# Patient Record
Sex: FEMALE | Race: BLACK OR AFRICAN AMERICAN | NOT HISPANIC OR LATINO | Employment: OTHER | URBAN - METROPOLITAN AREA
[De-identification: names, ages, dates, MRNs, and addresses within clinical notes are randomized per-mention and may not be internally consistent; named-entity substitution may affect disease eponyms.]

---

## 2017-09-18 DIAGNOSIS — Z12.31 SCREENING MAMMOGRAM, ENCOUNTER FOR: Primary | ICD-10-CM

## 2017-09-18 DIAGNOSIS — I10 HTN (HYPERTENSION): Primary | ICD-10-CM

## 2017-09-29 ENCOUNTER — HOSPITAL ENCOUNTER (OUTPATIENT)
Dept: RADIOLOGY | Facility: HOSPITAL | Age: 57
Discharge: HOME | End: 2017-09-29
Attending: INTERNAL MEDICINE
Payer: MEDICAID

## 2017-09-29 VITALS — HEIGHT: 64 IN | WEIGHT: 136 LBS | BODY MASS INDEX: 23.22 KG/M2

## 2017-09-29 DIAGNOSIS — Z12.31 SCREENING MAMMOGRAM, ENCOUNTER FOR: ICD-10-CM

## 2017-09-29 PROCEDURE — 77067 SCR MAMMO BI INCL CAD: CPT | Mod: 26,,, | Performed by: RADIOLOGY

## 2017-09-29 PROCEDURE — 77063 BREAST TOMOSYNTHESIS BI: CPT | Mod: 26,,, | Performed by: RADIOLOGY

## 2017-09-29 PROCEDURE — 77067 SCR MAMMO BI INCL CAD: CPT | Mod: TC

## 2018-03-12 ENCOUNTER — LAB VISIT (OUTPATIENT)
Dept: LAB | Facility: HOSPITAL | Age: 58
End: 2018-03-12
Attending: INTERNAL MEDICINE
Payer: MEDICAID

## 2018-03-12 DIAGNOSIS — I10 ESSENTIAL HYPERTENSION, MALIGNANT: Primary | ICD-10-CM

## 2018-03-12 DIAGNOSIS — I10 ESSENTIAL HYPERTENSION, MALIGNANT: ICD-10-CM

## 2018-03-12 LAB
ALBUMIN SERPL BCP-MCNC: 3.4 G/DL
ALP SERPL-CCNC: 79 U/L
ALT SERPL W/O P-5'-P-CCNC: 7 U/L
ANION GAP SERPL CALC-SCNC: 7 MMOL/L
AST SERPL-CCNC: 14 U/L
BASOPHILS # BLD AUTO: 0.04 K/UL
BASOPHILS NFR BLD: 0.8 %
BILIRUB SERPL-MCNC: 0.2 MG/DL
BUN SERPL-MCNC: 11 MG/DL
CALCIUM SERPL-MCNC: 9.4 MG/DL
CHLORIDE SERPL-SCNC: 106 MMOL/L
CO2 SERPL-SCNC: 29 MMOL/L
CREAT SERPL-MCNC: 1.2 MG/DL
DIFFERENTIAL METHOD: ABNORMAL
EOSINOPHIL # BLD AUTO: 0.2 K/UL
EOSINOPHIL NFR BLD: 4.7 %
ERYTHROCYTE [DISTWIDTH] IN BLOOD BY AUTOMATED COUNT: 14.8 %
EST. GFR  (AFRICAN AMERICAN): 58 ML/MIN/1.73 M^2
EST. GFR  (NON AFRICAN AMERICAN): 50.3 ML/MIN/1.73 M^2
GLUCOSE SERPL-MCNC: 75 MG/DL
HCT VFR BLD AUTO: 38 %
HGB BLD-MCNC: 12.3 G/DL
LYMPHOCYTES # BLD AUTO: 2.6 K/UL
LYMPHOCYTES NFR BLD: 50.4 %
MCH RBC QN AUTO: 27.5 PG
MCHC RBC AUTO-ENTMCNC: 32.4 G/DL
MCV RBC AUTO: 85 FL
MONOCYTES # BLD AUTO: 0.5 K/UL
MONOCYTES NFR BLD: 10.2 %
NEUTROPHILS # BLD AUTO: 1.7 K/UL
NEUTROPHILS NFR BLD: 33.7 %
PLATELET # BLD AUTO: 411 K/UL
PMV BLD AUTO: 8.3 FL
POTASSIUM SERPL-SCNC: 3.7 MMOL/L
PROT SERPL-MCNC: 6.9 G/DL
RBC # BLD AUTO: 4.48 M/UL
SODIUM SERPL-SCNC: 142 MMOL/L
TSH SERPL DL<=0.005 MIU/L-ACNC: 0.57 UIU/ML
WBC # BLD AUTO: 5.1 K/UL

## 2018-03-12 PROCEDURE — 36415 COLL VENOUS BLD VENIPUNCTURE: CPT | Mod: PO

## 2018-03-12 PROCEDURE — 84443 ASSAY THYROID STIM HORMONE: CPT | Mod: PO

## 2018-03-12 PROCEDURE — 85025 COMPLETE CBC W/AUTO DIFF WBC: CPT | Mod: PO

## 2018-03-12 PROCEDURE — 80061 LIPID PANEL: CPT

## 2018-03-12 PROCEDURE — 80053 COMPREHEN METABOLIC PANEL: CPT | Mod: PO

## 2018-03-13 LAB
CHOLEST SERPL-MCNC: 177 MG/DL
CHOLEST/HDLC SERPL: 3.4 {RATIO}
HDLC SERPL-MCNC: 52 MG/DL
HDLC SERPL: 29.4 %
LDLC SERPL CALC-MCNC: 91.8 MG/DL
NONHDLC SERPL-MCNC: 125 MG/DL
TRIGL SERPL-MCNC: 166 MG/DL

## 2018-11-16 ENCOUNTER — TELEPHONE (OUTPATIENT)
Dept: RADIOLOGY | Facility: HOSPITAL | Age: 58
End: 2018-11-16

## 2018-11-19 ENCOUNTER — TELEPHONE (OUTPATIENT)
Dept: RADIOLOGY | Facility: HOSPITAL | Age: 58
End: 2018-11-19

## 2019-01-21 DIAGNOSIS — R10.13 ABDOMINAL PAIN, EPIGASTRIC: Primary | ICD-10-CM

## 2019-05-18 ENCOUNTER — HOSPITAL ENCOUNTER (EMERGENCY)
Facility: HOSPITAL | Age: 59
Discharge: HOME | End: 2019-05-18
Attending: EMERGENCY MEDICINE
Payer: MEDICAID

## 2019-05-18 VITALS
HEIGHT: 64 IN | DIASTOLIC BLOOD PRESSURE: 72 MMHG | RESPIRATION RATE: 18 BRPM | TEMPERATURE: 98 F | BODY MASS INDEX: 22.58 KG/M2 | SYSTOLIC BLOOD PRESSURE: 120 MMHG | WEIGHT: 132.25 LBS | OXYGEN SATURATION: 97 % | HEART RATE: 72 BPM

## 2019-05-18 DIAGNOSIS — M77.11 LATERAL EPICONDYLITIS OF RIGHT ELBOW: Primary | ICD-10-CM

## 2019-05-18 PROCEDURE — 96372 THER/PROPH/DIAG INJ SC/IM: CPT | Mod: ER

## 2019-05-18 PROCEDURE — 99284 EMERGENCY DEPT VISIT MOD MDM: CPT | Mod: 25,ER

## 2019-05-18 PROCEDURE — 63600175 PHARM REV CODE 636 W HCPCS: Mod: ER | Performed by: NURSE PRACTITIONER

## 2019-05-18 RX ORDER — MELOXICAM 7.5 MG/1
7.5 TABLET ORAL DAILY
Qty: 10 TABLET | Refills: 0 | Status: SHIPPED | OUTPATIENT
Start: 2019-05-18

## 2019-05-18 RX ORDER — KETOROLAC TROMETHAMINE 30 MG/ML
60 INJECTION, SOLUTION INTRAMUSCULAR; INTRAVENOUS
Status: COMPLETED | OUTPATIENT
Start: 2019-05-18 | End: 2019-05-18

## 2019-05-18 RX ADMIN — KETOROLAC TROMETHAMINE 60 MG: 30 INJECTION, SOLUTION INTRAMUSCULAR; INTRAVENOUS at 08:05

## 2019-05-19 NOTE — ED PROVIDER NOTES
Encounter Date: 2019       History     Chief Complaint   Patient presents with    Muscle Pain     right elbow pain. Denies trauma.     The history is provided by the patient.   General Illness    The current episode started several days ago (RIGHT ELBOW AND ARM PAIN 4 DAYS AGO ). The problem occurs continuously. The problem has been gradually worsening (DENIES INJURY ). The pain is at a severity of 7/10. Nothing relieves the symptoms. The symptoms are aggravated by activity and movement. Pertinent negatives include no fever, no abdominal pain, no constipation, no diarrhea, no nausea, no vomiting, no sore throat, no muscle aches, no neck pain, no neck stiffness, no cough, no shortness of breath, no URI, no wheezing, no rash and no diaper rash. She has received no recent medical care.     Review of patient's allergies indicates:  No Known Allergies  Past Medical History:   Diagnosis Date    Anemia     Chronic constipation     Cystic disease of breast     Diverticulitis     Hyperlipidemia     Hypertension     PAD (peripheral artery disease)      Past Surgical History:   Procedure Laterality Date    ABDOMINAL SURGERY      BREAST CYST EXCISION Right     CHOLECYSTECTOMY      COLONOSCOPY N/A 3/18/2014    Performed by [facility] at [facility]    EGD (ESOPHAGOGASTRODUODENOSCOPY) N/A 3/18/2014    Performed by [facility] at [facility]    HYSTERECTOMY      LEG AMPUTATION      L BKA    TOTAL ABDOMINAL HYSTERECTOMY W/ BILATERAL SALPINGOOPHORECTOMY       Family History   Problem Relation Age of Onset    Diabetes Mother     Hypertension Sister     Cancer Sister     Colon cancer Sister     Hypertension Father     Breast cancer Maternal Aunt      Social History     Tobacco Use    Smoking status: Former Smoker     Packs/day: 0.25     Years: 10.00     Pack years: 2.50     Last attempt to quit: 3/26/2012     Years since quittin.1    Smokeless tobacco: Never Used    Tobacco comment:  quit 3 months ago   Substance Use Topics    Alcohol use: No     Alcohol/week: 0.0 oz    Drug use: No     Review of Systems   Constitutional: Negative for fever.   HENT: Negative for sore throat.    Respiratory: Negative for cough, shortness of breath and wheezing.    Cardiovascular: Negative for chest pain.   Gastrointestinal: Negative for abdominal pain, constipation, diarrhea, nausea and vomiting.   Genitourinary: Negative for dysuria.   Musculoskeletal: Negative for back pain and neck pain.        RIGHT ELBOW AND FOREARM PAIN    Skin: Negative for rash.   Neurological: Negative for weakness.   Hematological: Does not bruise/bleed easily.   All other systems reviewed and are negative.      Physical Exam     Initial Vitals [05/18/19 2018]   BP Pulse Resp Temp SpO2   115/66 69 18 98.4 °F (36.9 °C) 99 %      MAP       --         Physical Exam    Nursing note and vitals reviewed.  Constitutional: She appears well-developed and well-nourished.   HENT:   Head: Normocephalic and atraumatic.   Mouth/Throat: No oropharyngeal exudate.   Eyes: Conjunctivae, EOM and lids are normal. Pupils are equal, round, and reactive to light. Lids are everted and swept, no foreign bodies found.   Neck: Trachea normal, normal range of motion, full passive range of motion without pain and phonation normal. Neck supple.   Cardiovascular: Normal rate, regular rhythm, S1 normal, S2 normal, normal heart sounds, intact distal pulses and normal pulses.   Pulmonary/Chest: Effort normal and breath sounds normal. No respiratory distress.   Abdominal: Soft. Bowel sounds are normal.   Musculoskeletal: She exhibits tenderness.        Right elbow: She exhibits decreased range of motion. She exhibits no swelling and no deformity. Tenderness found. Lateral epicondyle tenderness noted.        Arms:  Right elbow pain    Lymphadenopathy:     She has no cervical adenopathy.     She has no axillary adenopathy.   Neurological: She is alert and oriented to  person, place, and time. She has normal strength and normal reflexes. No cranial nerve deficit or sensory deficit. She displays a negative Romberg sign.   Skin: Skin is warm and dry. Capillary refill takes less than 2 seconds.         ED Course   Procedures  Labs Reviewed - No data to display       Imaging Results    None                 All historical, clinical, radiographic, and laboratory findings were reviewed with the patient in detail.  Findings are consistent with a diagnosis of lateral epicondylitis.  All remaining questions and concerns were addressed at that time.  Patient has been counseled regarding the need for follow-up as well as the indication to return to the emergency room should new or worrisome developments occur.                Clinical Impression:       ICD-10-CM ICD-9-CM   1. Lateral epicondylitis of right elbow M77.11 726.32                                Juma Torres NP  05/18/19 2048

## 2019-07-08 DIAGNOSIS — N28.1 ACQUIRED CYST OF KIDNEY: ICD-10-CM

## 2019-07-08 DIAGNOSIS — M45.5 ANKYLOSING SPONDYLITIS OF THORACOLUMBAR REGION: Primary | ICD-10-CM

## 2019-07-17 ENCOUNTER — HOSPITAL ENCOUNTER (OUTPATIENT)
Dept: RADIOLOGY | Facility: HOSPITAL | Age: 59
Discharge: HOME | End: 2019-07-17
Attending: NURSE PRACTITIONER
Payer: MEDICAID

## 2019-07-17 DIAGNOSIS — M45.5 ANKYLOSING SPONDYLITIS OF THORACOLUMBAR REGION: ICD-10-CM

## 2019-07-17 DIAGNOSIS — N28.1 ACQUIRED CYST OF KIDNEY: ICD-10-CM

## 2019-07-17 PROCEDURE — 76770 US KIDNEY: ICD-10-PCS | Mod: 26,,, | Performed by: RADIOLOGY

## 2019-07-17 PROCEDURE — 76770 US EXAM ABDO BACK WALL COMP: CPT | Mod: TC,PO

## 2019-07-17 PROCEDURE — 76770 US EXAM ABDO BACK WALL COMP: CPT | Mod: 26,,, | Performed by: RADIOLOGY

## 2019-10-20 ENCOUNTER — HOSPITAL ENCOUNTER (EMERGENCY)
Facility: HOSPITAL | Age: 59
Discharge: HOME | End: 2019-10-20
Attending: EMERGENCY MEDICINE
Payer: MEDICAID

## 2019-10-20 VITALS
HEART RATE: 65 BPM | TEMPERATURE: 99 F | OXYGEN SATURATION: 98 % | WEIGHT: 125.69 LBS | SYSTOLIC BLOOD PRESSURE: 150 MMHG | BODY MASS INDEX: 21.57 KG/M2 | DIASTOLIC BLOOD PRESSURE: 79 MMHG | RESPIRATION RATE: 18 BRPM

## 2019-10-20 DIAGNOSIS — R21 RASH: Primary | ICD-10-CM

## 2019-10-20 DIAGNOSIS — T78.40XA ALLERGIC REACTION, INITIAL ENCOUNTER: ICD-10-CM

## 2019-10-20 DIAGNOSIS — I10 ELEVATED BLOOD PRESSURE READING WITH DIAGNOSIS OF HYPERTENSION: ICD-10-CM

## 2019-10-20 DIAGNOSIS — F17.200 CURRENT SMOKER: ICD-10-CM

## 2019-10-20 PROCEDURE — 63600175 PHARM REV CODE 636 W HCPCS: Mod: ER | Performed by: PHYSICIAN ASSISTANT

## 2019-10-20 PROCEDURE — 96372 THER/PROPH/DIAG INJ SC/IM: CPT | Mod: ER

## 2019-10-20 PROCEDURE — 99284 EMERGENCY DEPT VISIT MOD MDM: CPT | Mod: 25,ER

## 2019-10-20 RX ORDER — DIPHENHYDRAMINE HCL 25 MG
25 CAPSULE ORAL EVERY 6 HOURS PRN
Qty: 20 CAPSULE | Refills: 0 | Status: SHIPPED | OUTPATIENT
Start: 2019-10-20

## 2019-10-20 RX ORDER — DIPHENHYDRAMINE HYDROCHLORIDE 50 MG/ML
25 INJECTION INTRAMUSCULAR; INTRAVENOUS
Status: COMPLETED | OUTPATIENT
Start: 2019-10-20 | End: 2019-10-20

## 2019-10-20 RX ORDER — MUPIROCIN 20 MG/G
OINTMENT TOPICAL 3 TIMES DAILY
Qty: 30 G | Refills: 0 | Status: SHIPPED | OUTPATIENT
Start: 2019-10-20 | End: 2019-10-30

## 2019-10-20 RX ORDER — METHYLPREDNISOLONE 4 MG/1
TABLET ORAL
Qty: 1 PACKAGE | Refills: 0 | OUTPATIENT
Start: 2019-10-20 | End: 2024-03-03

## 2019-10-20 RX ORDER — ALBUTEROL SULFATE 90 UG/1
AEROSOL, METERED RESPIRATORY (INHALATION)
COMMUNITY

## 2019-10-20 RX ORDER — METHYLPREDNISOLONE SOD SUCC 125 MG
125 VIAL (EA) INJECTION ONCE
Status: COMPLETED | OUTPATIENT
Start: 2019-10-20 | End: 2019-10-20

## 2019-10-20 RX ORDER — LOSARTAN POTASSIUM 50 MG/1
50 TABLET ORAL
COMMUNITY

## 2019-10-20 RX ADMIN — DIPHENHYDRAMINE HYDROCHLORIDE 25 MG: 50 INJECTION INTRAMUSCULAR; INTRAVENOUS at 03:10

## 2019-10-20 RX ADMIN — METHYLPREDNISOLONE SODIUM SUCCINATE 125 MG: 125 INJECTION, POWDER, FOR SOLUTION INTRAMUSCULAR; INTRAVENOUS at 03:10

## 2019-10-20 NOTE — ED PROVIDER NOTES
History      Chief Complaint   Patient presents with    Rash     Rash to midline cervical neck, left side of neck, and left elbow. Pt c/o itching. Started yesterday, getting worse. Denies shortness of breath, exposure, or new meds. AAOx4       Review of patient's allergies indicates:  No Known Allergies     HPI   HPI    10/20/2019, 3:04 PM   History obtained from the patient      History of Present Illness: Margarita Menendezor is a 59 y.o. female patient who presents to the Emergency Department for rash of back, left side of neck and left arm x one day.  Associated symptoms include itching.  Patient denies new medication, soap, detergent, clothing, perfume.  Denies fever, vomiting, diarrhea, chest pain, SOB, headache, dizziness.  Treatments tried include Neosporin.        Arrival mode: Personal vehicle     PCP: GASPER Hinds       Past Medical History:  Past Medical History:   Diagnosis Date    Anemia     Chronic constipation     COPD (chronic obstructive pulmonary disease)     Cystic disease of breast     Diverticulitis     Hyperlipidemia     Hypertension     PAD (peripheral artery disease)        Past Surgical History:  Past Surgical History:   Procedure Laterality Date    ABDOMINAL SURGERY      BREAST CYST EXCISION Right     CHOLECYSTECTOMY      HYSTERECTOMY      LEG AMPUTATION      L BKA    TOTAL ABDOMINAL HYSTERECTOMY W/ BILATERAL SALPINGOOPHORECTOMY           Family History:  Family History   Problem Relation Age of Onset    Diabetes Mother     Hypertension Sister     Cancer Sister     Colon cancer Sister     Hypertension Father     Breast cancer Maternal Aunt        Social History:  Social History     Tobacco Use    Smoking status: Current Every Day Smoker     Packs/day: 0.25     Years: 10.00     Pack years: 2.50     Types: Cigarettes     Last attempt to quit: 3/26/2012     Years since quittin.5    Smokeless tobacco: Never Used    Tobacco comment: quit 3 months ago    Substance and Sexual Activity    Alcohol use: No     Alcohol/week: 0.0 standard drinks    Drug use: No    Sexual activity: Not Currently       ROS   Review of Systems   Constitutional: Negative for appetite change and fever.   HENT: Negative for congestion and rhinorrhea.    Eyes: Negative for photophobia and discharge.   Respiratory: Negative for cough and wheezing.    Cardiovascular: Negative for chest pain and palpitations.   Gastrointestinal: Negative for diarrhea, nausea and vomiting.   Genitourinary: Negative for dysuria and frequency.   Musculoskeletal: Negative for back pain and neck pain.   Skin: Positive for rash. Negative for wound.   Neurological: Negative for dizziness and headaches.       Physical Exam      Initial Vitals [10/20/19 1456]   BP Pulse Resp Temp SpO2   (!) 160/80 70 20 98 °F (36.7 °C) 99 %      MAP       --          Physical Exam  Nursing Notes and Vital Signs Reviewed.  Constitutional: Patient is in no apparent distress. Awake and alert. Well-developed and well-nourished.  Head: Atraumatic. Normocephalic.  Eyes: PERRL. EOM intact. Conjunctivae are not pale. No scleral icterus.  ENT: Mucous membranes are moist. Oropharynx is clear and symmetric.    Neck: Supple. Full ROM. No lymphadenopathy.  Cardiovascular: Regular rate. Regular rhythm. No murmurs, rubs, or gallops. Distal pulses are 2+ and symmetric.  Pulmonary/Chest: No respiratory distress. Clear to auscultation bilaterally. No wheezing, rales, or rhonchi.  Abdominal: Soft and non-distended.  There is no tenderness.  No rebound, guarding, or rigidity. Good bowel sounds.  Genitourinary: No CVA tenderness  Musculoskeletal: Moves all extremities. No obvious deformities. No edema. No calf tenderness.  Skin: Warm and dry.  Erythematous papules of back and left arm.  Skin colored papules of left side of neck.    Neurological:  Alert, awake, and appropriate.  Normal speech.  No acute focal neurological deficits are  appreciated.  Psychiatric: Normal affect. Good eye contact. Appropriate in content.    ED Course    Procedures  ED Vital Signs:  Vitals:    10/20/19 1456 10/20/19 1601   BP: (!) 160/80 (!) 150/79   Pulse: 70 65   Resp: 20 18   Temp: 98 °F (36.7 °C) 98.5 °F (36.9 °C)   TempSrc: Oral    SpO2: 99% 98%   Weight: 57 kg (125 lb 10.6 oz)        Abnormal Lab Results:  Labs Reviewed - No data to display     All Lab Results:  None    Imaging Results:  Imaging Results    None                 The Emergency Provider reviewed the vital signs and test results, which are outlined above.    ED Discussion     3:55 PM: Reassessed pt at this time.  Pt states her condition has improved at this time. Discussed with pt all pertinent ED information and results. Discussed pt dx and plan of tx. Gave pt all f/u and return to the ED instructions. All questions and concerns were addressed at this time. Pt expresses understanding of information and instructions, and is comfortable with plan to discharge. Pt is stable for discharge.    I discussed with patient and/or family/caretaker that evaluation in the ED does not suggest any emergent or life threatening medical conditions requiring immediate intervention beyond what was provided in the ED, and I believe patient is safe for discharge.  Regardless, an unremarkable evaluation in the ED does not preclude the development or presence of a serious of life threatening condition. As such, patient was instructed to return immediately for any worsening or change in current symptoms.    Pre-hypertension/Hypertension: The pt has been informed that they may have pre-hypertension or hypertension based on a blood pressure reading in the ED. I recommend that the pt call the PCP listed on their discharge instructions or a physician of their choice this week to arrange f/u for further evaluation of possible pre-hypertension or hypertension.       ED Medication(s):  Medications   diphenhydrAMINE injection 25 mg  (25 mg Intramuscular Given 10/20/19 1538)   methylPREDNISolone sodium succinate injection 125 mg (125 mg Intramuscular Given 10/20/19 1538)       Discharge Medication List as of 10/20/2019  3:29 PM      START taking these medications    Details   diphenhydrAMINE (BENADRYL) 25 mg capsule Take 1 capsule (25 mg total) by mouth every 6 (six) hours as needed for Itching or Allergies., Starting Sun 10/20/2019, Print      methylPREDNISolone (MEDROL DOSEPACK) 4 mg tablet use as directed, Print      mupirocin (BACTROBAN) 2 % ointment Apply topically 3 (three) times daily. for 10 days, Starting Sun 10/20/2019, Until Wed 10/30/2019, Print             Follow-up Information     GASPER Hinds. Schedule an appointment as soon as possible for a visit in 3 days.    Specialty:  Family Medicine  Contact information:  [address] [place] [id number]  [phone]                     Medical Decision Making        Additional MDM:   Hypertension: The patient has hypertension (no treatment required at this time). The patient's condition was felt to be stable.   Smoking Cessation: The patient is a smoker. The patient was counseled on smoking cessation for: 3 minutes. The patient was counseled on tobacco related  health complications.            Clinical Impression       ICD-10-CM ICD-9-CM   1. Rash R21 782.1   2. Allergic reaction, initial encounter T78.40XA 995.3   3. Elevated blood pressure reading with diagnosis of hypertension I10 401.9   4. Current smoker F17.200 305.1       Disposition:   Disposition: Discharged  Condition: Stable           Leslie Layton PA-C  10/20/19 8357

## 2019-12-16 DIAGNOSIS — M25.522 LEFT ELBOW PAIN: Primary | ICD-10-CM

## 2020-02-19 ENCOUNTER — HOSPITAL ENCOUNTER (OUTPATIENT)
Dept: RADIOLOGY | Facility: HOSPITAL | Age: 60
Discharge: HOME | End: 2020-02-19
Attending: NURSE PRACTITIONER
Payer: MEDICAID

## 2020-02-19 VITALS — BODY MASS INDEX: 21.46 KG/M2 | HEIGHT: 64 IN | WEIGHT: 125.69 LBS

## 2020-02-19 DIAGNOSIS — Z12.31 ENCOUNTER FOR SCREENING MAMMOGRAM FOR MALIGNANT NEOPLASM OF BREAST: ICD-10-CM

## 2020-02-19 PROCEDURE — 77067 SCR MAMMO BI INCL CAD: CPT | Mod: 26,,, | Performed by: RADIOLOGY

## 2020-02-19 PROCEDURE — 77063 BREAST TOMOSYNTHESIS BI: CPT | Mod: 26,,, | Performed by: RADIOLOGY

## 2020-02-19 PROCEDURE — 77067 SCR MAMMO BI INCL CAD: CPT | Mod: TC,PO

## 2020-02-19 PROCEDURE — 77063 MAMMO DIGITAL SCREENING BILAT WITH TOMOSYNTHESIS_CAD: ICD-10-PCS | Mod: 26,,, | Performed by: RADIOLOGY

## 2020-02-19 PROCEDURE — 77067 MAMMO DIGITAL SCREENING BILAT WITH TOMOSYNTHESIS_CAD: ICD-10-PCS | Mod: 26,,, | Performed by: RADIOLOGY

## 2021-02-05 DIAGNOSIS — M54.9 DORSALGIA, UNSPECIFIED: ICD-10-CM

## 2021-02-05 DIAGNOSIS — R52 SCAR PAINFUL: Primary | ICD-10-CM

## 2021-02-05 DIAGNOSIS — L90.5 SCAR PAINFUL: Primary | ICD-10-CM

## 2021-03-17 ENCOUNTER — HOSPITAL ENCOUNTER (OUTPATIENT)
Dept: RADIOLOGY | Facility: HOSPITAL | Age: 61
Discharge: HOME | End: 2021-03-17
Attending: NURSE PRACTITIONER
Payer: MEDICAID

## 2021-03-17 VITALS — HEIGHT: 64 IN | WEIGHT: 125.69 LBS | BODY MASS INDEX: 21.46 KG/M2

## 2021-03-17 DIAGNOSIS — Z12.31 BREAST CANCER SCREENING BY MAMMOGRAM: ICD-10-CM

## 2021-03-17 PROCEDURE — 77063 MAMMO DIGITAL SCREENING BILAT WITH TOMO: ICD-10-PCS | Mod: 26,,, | Performed by: RADIOLOGY

## 2021-03-17 PROCEDURE — 77067 SCR MAMMO BI INCL CAD: CPT | Mod: TC,PO

## 2021-03-17 PROCEDURE — 77067 MAMMO DIGITAL SCREENING BILAT WITH TOMO: ICD-10-PCS | Mod: 26,,, | Performed by: RADIOLOGY

## 2021-03-17 PROCEDURE — 77067 SCR MAMMO BI INCL CAD: CPT | Mod: 26,,, | Performed by: RADIOLOGY

## 2021-03-17 PROCEDURE — 77063 BREAST TOMOSYNTHESIS BI: CPT | Mod: 26,,, | Performed by: RADIOLOGY

## 2021-05-04 ENCOUNTER — HOSPITAL ENCOUNTER (OUTPATIENT)
Dept: RADIOLOGY | Facility: HOSPITAL | Age: 61
Discharge: HOME | End: 2021-05-04
Attending: NURSE PRACTITIONER
Payer: MEDICAID

## 2021-05-04 DIAGNOSIS — R52 SCAR PAINFUL: ICD-10-CM

## 2021-05-04 DIAGNOSIS — M54.9 DORSALGIA, UNSPECIFIED: ICD-10-CM

## 2021-05-04 DIAGNOSIS — L90.5 SCAR PAINFUL: ICD-10-CM

## 2021-05-04 PROCEDURE — 72100 X-RAY EXAM L-S SPINE 2/3 VWS: CPT | Mod: 26,,, | Performed by: RADIOLOGY

## 2021-05-04 PROCEDURE — 72100 XR LUMBAR SPINE AP AND LATERAL: ICD-10-PCS | Mod: 26,,, | Performed by: RADIOLOGY

## 2021-05-04 PROCEDURE — 72070 X-RAY EXAM THORAC SPINE 2VWS: CPT | Mod: TC,PO

## 2021-05-04 PROCEDURE — 72040 X-RAY EXAM NECK SPINE 2-3 VW: CPT | Mod: 26,,, | Performed by: RADIOLOGY

## 2021-05-04 PROCEDURE — 72100 X-RAY EXAM L-S SPINE 2/3 VWS: CPT | Mod: TC,PO

## 2021-05-04 PROCEDURE — 72070 XR THORACIC SPINE AP LATERAL: ICD-10-PCS | Mod: 26,,, | Performed by: RADIOLOGY

## 2021-05-04 PROCEDURE — 72070 X-RAY EXAM THORAC SPINE 2VWS: CPT | Mod: 26,,, | Performed by: RADIOLOGY

## 2021-05-04 PROCEDURE — 72040 XR CERVICAL SPINE AP LATERAL: ICD-10-PCS | Mod: 26,,, | Performed by: RADIOLOGY

## 2021-05-04 PROCEDURE — 72040 X-RAY EXAM NECK SPINE 2-3 VW: CPT | Mod: TC,PO

## 2021-12-01 ENCOUNTER — HOSPITAL ENCOUNTER (EMERGENCY)
Facility: HOSPITAL | Age: 61
Discharge: HOME | End: 2021-12-01
Attending: EMERGENCY MEDICINE
Payer: MEDICAID

## 2021-12-01 VITALS
OXYGEN SATURATION: 98 % | SYSTOLIC BLOOD PRESSURE: 110 MMHG | DIASTOLIC BLOOD PRESSURE: 59 MMHG | RESPIRATION RATE: 18 BRPM | HEART RATE: 63 BPM

## 2021-12-01 DIAGNOSIS — R55 SYNCOPE, UNSPECIFIED SYNCOPE TYPE: Primary | ICD-10-CM

## 2021-12-01 LAB
ALBUMIN SERPL BCP-MCNC: 3.5 G/DL (ref 3.5–5.2)
ALP SERPL-CCNC: 78 U/L (ref 55–135)
ALT SERPL W/O P-5'-P-CCNC: 9 U/L (ref 10–44)
ANION GAP SERPL CALC-SCNC: 14 MMOL/L (ref 8–16)
AST SERPL-CCNC: 19 U/L (ref 10–40)
BASOPHILS # BLD AUTO: 0.03 K/UL (ref 0–0.2)
BASOPHILS NFR BLD: 0.5 % (ref 0–1.9)
BILIRUB SERPL-MCNC: 0.3 MG/DL (ref 0.1–1)
BNP SERPL-MCNC: 268 PG/ML (ref 0–99)
BUN SERPL-MCNC: 12 MG/DL (ref 8–23)
CALCIUM SERPL-MCNC: 9.5 MG/DL (ref 8.7–10.5)
CHLORIDE SERPL-SCNC: 105 MMOL/L (ref 95–110)
CO2 SERPL-SCNC: 24 MMOL/L (ref 23–29)
CREAT SERPL-MCNC: 1.2 MG/DL (ref 0.5–1.4)
DIFFERENTIAL METHOD: ABNORMAL
EOSINOPHIL # BLD AUTO: 0.2 K/UL (ref 0–0.5)
EOSINOPHIL NFR BLD: 3.2 % (ref 0–8)
ERYTHROCYTE [DISTWIDTH] IN BLOOD BY AUTOMATED COUNT: 15.2 % (ref 11.5–14.5)
EST. GFR  (AFRICAN AMERICAN): 56 ML/MIN/1.73 M^2
EST. GFR  (NON AFRICAN AMERICAN): 49 ML/MIN/1.73 M^2
GLUCOSE SERPL-MCNC: 99 MG/DL (ref 70–110)
HCT VFR BLD AUTO: 39.4 % (ref 37–48.5)
HGB BLD-MCNC: 12.8 G/DL (ref 12–16)
IMM GRANULOCYTES # BLD AUTO: 0.02 K/UL (ref 0–0.04)
IMM GRANULOCYTES NFR BLD AUTO: 0.4 % (ref 0–0.5)
LYMPHOCYTES # BLD AUTO: 2.6 K/UL (ref 1–4.8)
LYMPHOCYTES NFR BLD: 46.2 % (ref 18–48)
MAGNESIUM SERPL-MCNC: 1.8 MG/DL (ref 1.6–2.6)
MCH RBC QN AUTO: 27.9 PG (ref 27–31)
MCHC RBC AUTO-ENTMCNC: 32.5 G/DL (ref 32–36)
MCV RBC AUTO: 86 FL (ref 82–98)
MONOCYTES # BLD AUTO: 0.5 K/UL (ref 0.3–1)
MONOCYTES NFR BLD: 9.5 % (ref 4–15)
NEUTROPHILS # BLD AUTO: 2.3 K/UL (ref 1.8–7.7)
NEUTROPHILS NFR BLD: 40.2 % (ref 38–73)
NRBC BLD-RTO: 0 /100 WBC
PLATELET # BLD AUTO: 349 K/UL (ref 150–450)
PMV BLD AUTO: 8.8 FL (ref 9.2–12.9)
POTASSIUM SERPL-SCNC: 2.8 MMOL/L (ref 3.5–5.1)
PROT SERPL-MCNC: 7 G/DL (ref 6–8.4)
RBC # BLD AUTO: 4.59 M/UL (ref 4–5.4)
SODIUM SERPL-SCNC: 143 MMOL/L (ref 136–145)
TROPONIN I SERPL DL<=0.01 NG/ML-MCNC: 0.03 NG/ML (ref 0–0.03)
WBC # BLD AUTO: 5.67 K/UL (ref 3.9–12.7)

## 2021-12-01 PROCEDURE — 99285 EMERGENCY DEPT VISIT HI MDM: CPT | Mod: 25

## 2021-12-01 PROCEDURE — 93005 ELECTROCARDIOGRAM TRACING: CPT

## 2021-12-01 PROCEDURE — 36415 COLL VENOUS BLD VENIPUNCTURE: CPT | Performed by: EMERGENCY MEDICINE

## 2021-12-01 PROCEDURE — 85025 COMPLETE CBC W/AUTO DIFF WBC: CPT | Performed by: EMERGENCY MEDICINE

## 2021-12-01 PROCEDURE — 83735 ASSAY OF MAGNESIUM: CPT | Performed by: EMERGENCY MEDICINE

## 2021-12-01 PROCEDURE — 93010 EKG 12-LEAD: ICD-10-PCS | Mod: 59,,, | Performed by: INTERNAL MEDICINE

## 2021-12-01 PROCEDURE — 36000 PLACE NEEDLE IN VEIN: CPT

## 2021-12-01 PROCEDURE — 83880 ASSAY OF NATRIURETIC PEPTIDE: CPT | Performed by: EMERGENCY MEDICINE

## 2021-12-01 PROCEDURE — 80053 COMPREHEN METABOLIC PANEL: CPT | Performed by: EMERGENCY MEDICINE

## 2021-12-01 PROCEDURE — 25000003 PHARM REV CODE 250: Performed by: EMERGENCY MEDICINE

## 2021-12-01 PROCEDURE — 84484 ASSAY OF TROPONIN QUANT: CPT | Performed by: EMERGENCY MEDICINE

## 2021-12-01 PROCEDURE — 93010 ELECTROCARDIOGRAM REPORT: CPT | Mod: ,,, | Performed by: INTERNAL MEDICINE

## 2021-12-01 RX ORDER — POTASSIUM CHLORIDE 20 MEQ/1
40 TABLET, EXTENDED RELEASE ORAL
Status: COMPLETED | OUTPATIENT
Start: 2021-12-01 | End: 2021-12-01

## 2021-12-01 RX ADMIN — POTASSIUM CHLORIDE 40 MEQ: 20 TABLET, EXTENDED RELEASE ORAL at 03:12

## 2022-01-26 DIAGNOSIS — E87.6 HYPOPOTASSEMIA: ICD-10-CM

## 2022-01-26 DIAGNOSIS — Z13.29 SCREENING FOR THYROID DISORDER: ICD-10-CM

## 2022-01-26 DIAGNOSIS — Z13.1 SCREENING FOR DIABETES MELLITUS: ICD-10-CM

## 2022-01-26 DIAGNOSIS — E55.9 AVITAMINOSIS D: Primary | ICD-10-CM

## 2022-01-26 DIAGNOSIS — I10 ESSENTIAL HYPERTENSION, MALIGNANT: ICD-10-CM

## 2022-01-26 DIAGNOSIS — E78.2 MIXED HYPERLIPIDEMIA: ICD-10-CM

## 2022-01-27 ENCOUNTER — HOSPITAL ENCOUNTER (OUTPATIENT)
Dept: RADIOLOGY | Facility: HOSPITAL | Age: 62
Discharge: HOME | End: 2022-01-27
Attending: NURSE PRACTITIONER
Payer: MEDICAID

## 2022-01-27 DIAGNOSIS — R10.13 ABDOMINAL PAIN, EPIGASTRIC: ICD-10-CM

## 2022-01-27 DIAGNOSIS — M79.671 RIGHT FOOT PAIN: Primary | ICD-10-CM

## 2022-01-27 DIAGNOSIS — R10.13 ABDOMINAL PAIN, EPIGASTRIC: Primary | ICD-10-CM

## 2022-01-27 DIAGNOSIS — M79.671 RIGHT FOOT PAIN: ICD-10-CM

## 2022-01-27 PROCEDURE — 76700 US EXAM ABDOM COMPLETE: CPT | Mod: TC,PO

## 2022-01-27 PROCEDURE — 73630 XR FOOT COMPLETE 3 VIEW RIGHT: ICD-10-PCS | Mod: 26,RT,, | Performed by: RADIOLOGY

## 2022-01-27 PROCEDURE — 73630 X-RAY EXAM OF FOOT: CPT | Mod: 26,RT,, | Performed by: RADIOLOGY

## 2022-01-27 PROCEDURE — 76700 US EXAM ABDOM COMPLETE: CPT | Mod: 26,,, | Performed by: RADIOLOGY

## 2022-01-27 PROCEDURE — 74018 RADEX ABDOMEN 1 VIEW: CPT | Mod: 26,,, | Performed by: RADIOLOGY

## 2022-01-27 PROCEDURE — 74018 RADEX ABDOMEN 1 VIEW: CPT | Mod: TC,PO

## 2022-01-27 PROCEDURE — 73630 X-RAY EXAM OF FOOT: CPT | Mod: TC,PO,RT

## 2022-01-27 PROCEDURE — 74018 XR ABDOMEN AP 1 VIEW: ICD-10-PCS | Mod: 26,,, | Performed by: RADIOLOGY

## 2022-01-27 PROCEDURE — 76700 US ABDOMEN COMPLETE: ICD-10-PCS | Mod: 26,,, | Performed by: RADIOLOGY

## 2022-05-12 ENCOUNTER — HOSPITAL ENCOUNTER (EMERGENCY)
Facility: HOSPITAL | Age: 62
Discharge: HOME | End: 2022-05-12
Attending: EMERGENCY MEDICINE
Payer: MEDICAID

## 2022-05-12 VITALS
TEMPERATURE: 97 F | RESPIRATION RATE: 18 BRPM | WEIGHT: 127.75 LBS | BODY MASS INDEX: 21.93 KG/M2 | OXYGEN SATURATION: 100 % | DIASTOLIC BLOOD PRESSURE: 60 MMHG | HEART RATE: 63 BPM | SYSTOLIC BLOOD PRESSURE: 126 MMHG

## 2022-05-12 DIAGNOSIS — E87.6 HYPOKALEMIA: ICD-10-CM

## 2022-05-12 DIAGNOSIS — K57.32 DIVERTICULITIS OF SIGMOID COLON: Primary | ICD-10-CM

## 2022-05-12 LAB
ALBUMIN SERPL BCP-MCNC: 3.5 G/DL (ref 3.5–5.2)
ALP SERPL-CCNC: 96 U/L (ref 55–135)
ALT SERPL W/O P-5'-P-CCNC: 9 U/L (ref 10–44)
ANION GAP SERPL CALC-SCNC: 15 MMOL/L (ref 8–16)
AST SERPL-CCNC: 13 U/L (ref 10–40)
BACTERIA #/AREA URNS AUTO: ABNORMAL /HPF
BASOPHILS # BLD AUTO: 0.05 K/UL (ref 0–0.2)
BASOPHILS NFR BLD: 0.5 % (ref 0–1.9)
BILIRUB SERPL-MCNC: 0.7 MG/DL (ref 0.1–1)
BILIRUB UR QL STRIP: NEGATIVE
BUN SERPL-MCNC: 10 MG/DL (ref 8–23)
CALCIUM SERPL-MCNC: 10 MG/DL (ref 8.7–10.5)
CHLORIDE SERPL-SCNC: 99 MMOL/L (ref 95–110)
CLARITY UR REFRACT.AUTO: ABNORMAL
CO2 SERPL-SCNC: 26 MMOL/L (ref 23–29)
COLOR UR AUTO: YELLOW
CREAT SERPL-MCNC: 1.2 MG/DL (ref 0.5–1.4)
DIFFERENTIAL METHOD: ABNORMAL
EOSINOPHIL # BLD AUTO: 0.1 K/UL (ref 0–0.5)
EOSINOPHIL NFR BLD: 0.5 % (ref 0–8)
ERYTHROCYTE [DISTWIDTH] IN BLOOD BY AUTOMATED COUNT: 13.7 % (ref 11.5–14.5)
EST. GFR  (AFRICAN AMERICAN): 56.4 ML/MIN/1.73 M^2
EST. GFR  (NON AFRICAN AMERICAN): 48.9 ML/MIN/1.73 M^2
GLUCOSE SERPL-MCNC: 105 MG/DL (ref 70–110)
GLUCOSE UR QL STRIP: NEGATIVE
HCT VFR BLD AUTO: 37 % (ref 37–48.5)
HGB BLD-MCNC: 12.5 G/DL (ref 12–16)
HGB UR QL STRIP: ABNORMAL
HYALINE CASTS UR QL AUTO: 10 /LPF
IMM GRANULOCYTES # BLD AUTO: 0.03 K/UL (ref 0–0.04)
IMM GRANULOCYTES NFR BLD AUTO: 0.3 % (ref 0–0.5)
KETONES UR QL STRIP: NEGATIVE
LEUKOCYTE ESTERASE UR QL STRIP: ABNORMAL
LIPASE SERPL-CCNC: 20 U/L (ref 4–60)
LYMPHOCYTES # BLD AUTO: 1.8 K/UL (ref 1–4.8)
LYMPHOCYTES NFR BLD: 17.1 % (ref 18–48)
MAGNESIUM SERPL-MCNC: 1.7 MG/DL (ref 1.6–2.6)
MCH RBC QN AUTO: 28.3 PG (ref 27–31)
MCHC RBC AUTO-ENTMCNC: 33.8 G/DL (ref 32–36)
MCV RBC AUTO: 84 FL (ref 82–98)
MICROSCOPIC COMMENT: ABNORMAL
MONOCYTES # BLD AUTO: 0.8 K/UL (ref 0.3–1)
MONOCYTES NFR BLD: 7.6 % (ref 4–15)
NEUTROPHILS # BLD AUTO: 7.6 K/UL (ref 1.8–7.7)
NEUTROPHILS NFR BLD: 74 % (ref 38–73)
NITRITE UR QL STRIP: NEGATIVE
NRBC BLD-RTO: 0 /100 WBC
PH UR STRIP: 6 [PH] (ref 5–8)
PLATELET # BLD AUTO: 392 K/UL (ref 150–450)
PMV BLD AUTO: 8.8 FL (ref 9.2–12.9)
POTASSIUM SERPL-SCNC: 2.5 MMOL/L (ref 3.5–5.1)
POTASSIUM SERPL-SCNC: 2.8 MMOL/L (ref 3.5–5.1)
PROT SERPL-MCNC: 7.6 G/DL (ref 6–8.4)
PROT UR QL STRIP: ABNORMAL
RBC # BLD AUTO: 4.41 M/UL (ref 4–5.4)
RBC #/AREA URNS AUTO: 5 /HPF (ref 0–4)
SODIUM SERPL-SCNC: 140 MMOL/L (ref 136–145)
SP GR UR STRIP: 1.03 (ref 1–1.03)
SQUAMOUS #/AREA URNS AUTO: 1 /HPF
TRICHOMONAS UR QL COMP ASSIST: ABNORMAL
URN SPEC COLLECT METH UR: ABNORMAL
UROBILINOGEN UR STRIP-ACNC: NEGATIVE EU/DL
WBC # BLD AUTO: 10.33 K/UL (ref 3.9–12.7)
WBC #/AREA URNS AUTO: 6 /HPF (ref 0–5)
WBC CLUMPS UR QL AUTO: ABNORMAL

## 2022-05-12 PROCEDURE — 96365 THER/PROPH/DIAG IV INF INIT: CPT | Mod: ER

## 2022-05-12 PROCEDURE — 93005 ELECTROCARDIOGRAM TRACING: CPT | Mod: ER

## 2022-05-12 PROCEDURE — 84132 ASSAY OF SERUM POTASSIUM: CPT | Mod: ER,91 | Performed by: EMERGENCY MEDICINE

## 2022-05-12 PROCEDURE — 81000 URINALYSIS NONAUTO W/SCOPE: CPT | Mod: ER | Performed by: EMERGENCY MEDICINE

## 2022-05-12 PROCEDURE — 63600175 PHARM REV CODE 636 W HCPCS: Mod: ER | Performed by: EMERGENCY MEDICINE

## 2022-05-12 PROCEDURE — 25000003 PHARM REV CODE 250: Mod: ER | Performed by: EMERGENCY MEDICINE

## 2022-05-12 PROCEDURE — 83735 ASSAY OF MAGNESIUM: CPT | Mod: ER | Performed by: EMERGENCY MEDICINE

## 2022-05-12 PROCEDURE — 99285 EMERGENCY DEPT VISIT HI MDM: CPT | Mod: 25,ER

## 2022-05-12 PROCEDURE — 85025 COMPLETE CBC W/AUTO DIFF WBC: CPT | Mod: ER | Performed by: EMERGENCY MEDICINE

## 2022-05-12 PROCEDURE — 93010 EKG 12-LEAD: ICD-10-PCS | Mod: ,,, | Performed by: INTERNAL MEDICINE

## 2022-05-12 PROCEDURE — 80053 COMPREHEN METABOLIC PANEL: CPT | Mod: ER | Performed by: EMERGENCY MEDICINE

## 2022-05-12 PROCEDURE — 93010 ELECTROCARDIOGRAM REPORT: CPT | Mod: ,,, | Performed by: INTERNAL MEDICINE

## 2022-05-12 PROCEDURE — 83690 ASSAY OF LIPASE: CPT | Mod: ER | Performed by: EMERGENCY MEDICINE

## 2022-05-12 PROCEDURE — 96375 TX/PRO/DX INJ NEW DRUG ADDON: CPT | Mod: ER

## 2022-05-12 RX ORDER — MORPHINE SULFATE 4 MG/ML
2 INJECTION, SOLUTION INTRAMUSCULAR; INTRAVENOUS
Status: COMPLETED | OUTPATIENT
Start: 2022-05-12 | End: 2022-05-12

## 2022-05-12 RX ORDER — POTASSIUM CHLORIDE 20 MEQ/1
40 TABLET, EXTENDED RELEASE ORAL
Status: COMPLETED | OUTPATIENT
Start: 2022-05-12 | End: 2022-05-12

## 2022-05-12 RX ORDER — CIPROFLOXACIN 500 MG/1
500 TABLET ORAL 2 TIMES DAILY
Qty: 14 TABLET | Refills: 0 | Status: SHIPPED | OUTPATIENT
Start: 2022-05-12 | End: 2022-05-12 | Stop reason: SDUPTHER

## 2022-05-12 RX ORDER — POTASSIUM CHLORIDE 750 MG/1
10 TABLET, EXTENDED RELEASE ORAL 2 TIMES DAILY
Qty: 14 TABLET | Refills: 0 | Status: SHIPPED | OUTPATIENT
Start: 2022-05-12

## 2022-05-12 RX ORDER — CIPROFLOXACIN 500 MG/1
500 TABLET ORAL 2 TIMES DAILY
Qty: 14 TABLET | Refills: 0 | Status: SHIPPED | OUTPATIENT
Start: 2022-05-12 | End: 2022-05-19

## 2022-05-12 RX ORDER — METRONIDAZOLE 500 MG/1
500 TABLET ORAL 3 TIMES DAILY
Qty: 21 TABLET | Refills: 0 | Status: SHIPPED | OUTPATIENT
Start: 2022-05-12 | End: 2022-05-19

## 2022-05-12 RX ORDER — POTASSIUM CHLORIDE 7.45 MG/ML
10 INJECTION INTRAVENOUS
Status: COMPLETED | OUTPATIENT
Start: 2022-05-12 | End: 2022-05-12

## 2022-05-12 RX ORDER — METRONIDAZOLE 500 MG/1
500 TABLET ORAL 3 TIMES DAILY
Qty: 21 TABLET | Refills: 0 | Status: SHIPPED | OUTPATIENT
Start: 2022-05-12 | End: 2022-05-12 | Stop reason: SDUPTHER

## 2022-05-12 RX ORDER — TRAMADOL HYDROCHLORIDE AND ACETAMINOPHEN 37.5; 325 MG/1; MG/1
1 TABLET, FILM COATED ORAL EVERY 6 HOURS PRN
Qty: 12 TABLET | Refills: 0 | Status: SHIPPED | OUTPATIENT
Start: 2022-05-12 | End: 2022-05-22

## 2022-05-12 RX ORDER — POTASSIUM CHLORIDE 750 MG/1
10 TABLET, EXTENDED RELEASE ORAL 2 TIMES DAILY
Qty: 14 TABLET | Refills: 0 | Status: SHIPPED | OUTPATIENT
Start: 2022-05-12 | End: 2022-05-12 | Stop reason: SDUPTHER

## 2022-05-12 RX ADMIN — POTASSIUM CHLORIDE 40 MEQ: 1500 TABLET, EXTENDED RELEASE ORAL at 07:05

## 2022-05-12 RX ADMIN — POTASSIUM CHLORIDE 10 MEQ: 7.46 INJECTION, SOLUTION INTRAVENOUS at 08:05

## 2022-05-12 RX ADMIN — MORPHINE SULFATE 2 MG: 4 INJECTION INTRAVENOUS at 07:05

## 2022-05-12 NOTE — ED PROVIDER NOTES
Encounter Date: 5/12/2022       History     Chief Complaint   Patient presents with    Abdominal Pain     Pt has diverticulitis. Was seen by gi dr this morning who told her to go to er.      Chief complaint: LLQ abdominal pain.    History of present illness: 61 y.o female with c/o onset of LLQ pain this past Sunday (4 days ago). Pain well-localized to LLQ and patient relates she has a history of Diverticulosis. Saw MD today and given meds (? Names) for the discomfort but no improvement and presented here for relief. No c/o fever or chills. No aggravating or mitigating factors. Does have  GI MD who cares for her condition. Pain severity is 10/10. No c/o vomiting or diarrhea. No dysuris,hematuria or flank pain.        Review of patient's allergies indicates:  No Known Allergies  Past Medical History:   Diagnosis Date    Anemia     Chronic constipation     COPD (chronic obstructive pulmonary disease)     Cystic disease of breast     Diverticulitis     Hyperlipidemia     Hypertension     PAD (peripheral artery disease)      Past Surgical History:   Procedure Laterality Date    ABDOMINAL SURGERY      BREAST CYST EXCISION Right     CHOLECYSTECTOMY      HYSTERECTOMY      LEG AMPUTATION      L BKA    TOTAL ABDOMINAL HYSTERECTOMY W/ BILATERAL SALPINGOOPHORECTOMY       Family History   Problem Relation Age of Onset    Diabetes Mother     Hypertension Sister     Cancer Sister     Colon cancer Sister     Hypertension Father     Breast cancer Maternal Aunt      Social History     Tobacco Use    Smoking status: Current Every Day Smoker     Packs/day: 0.25     Years: 10.00     Pack years: 2.50     Types: Cigarettes     Last attempt to quit: 3/26/2012     Years since quitting: 10.1    Smokeless tobacco: Never Used    Tobacco comment: quit 3 months ago   Substance Use Topics    Alcohol use: No     Alcohol/week: 0.0 standard drinks    Drug use: No     Review of Systems   Constitutional: Negative for activity  change, appetite change, chills, fatigue and fever.   HENT: Negative.    Eyes: Negative.    Respiratory: Negative for chest tightness and shortness of breath.    Cardiovascular: Negative for chest pain.   Gastrointestinal: Positive for abdominal pain (LLQ). Negative for blood in stool, constipation, diarrhea, nausea and vomiting.   Genitourinary: Negative.    Musculoskeletal: Negative.    Neurological: Negative for dizziness and light-headedness.   Psychiatric/Behavioral: Negative.    All other systems reviewed and are negative.      Physical Exam     Initial Vitals [05/12/22 1844]   BP Pulse Resp Temp SpO2   115/63 68 18 97.4 °F (36.3 °C) 100 %      MAP       --         Physical Exam    Nursing note and vitals reviewed.  Constitutional: She appears well-nourished. She appears distressed.   HENT:   Head: Normocephalic and atraumatic.   Right Ear: External ear normal.   Left Ear: External ear normal.   Mouth/Throat: Uvula is midline. Mucous membranes are dry.   Cardiovascular: Normal rate, regular rhythm and intact distal pulses.   Pulmonary/Chest: Breath sounds normal. No respiratory distress.   Abdominal: Abdomen is soft. Bowel sounds are normal. She exhibits no distension. There is abdominal tenderness (LLQ). There is guarding (Voluntary). There is no rebound.   Musculoskeletal:         General: Normal range of motion.      Comments: Left BKA      Neurological: She is alert and oriented to person, place, and time. She has normal strength. GCS score is 15. GCS eye subscore is 4. GCS verbal subscore is 5. GCS motor subscore is 6.   Skin: Skin is warm and dry.   Psychiatric: She has a normal mood and affect. Her behavior is normal.         ED Course   Procedures  Labs Reviewed   CBC W/ AUTO DIFFERENTIAL - Abnormal; Notable for the following components:       Result Value    MPV 8.8 (*)     Gran % 74.0 (*)     Lymph % 17.1 (*)     All other components within normal limits   COMPREHENSIVE METABOLIC PANEL - Abnormal;  Notable for the following components:    Potassium 2.5 (*)     ALT 9 (*)     eGFR if  56.4 (*)     eGFR if non  48.9 (*)     All other components within normal limits    Narrative:     Potassium critical result(s) called and verbal readback obtained from   Dago Royal RN by TINO 05/12/2022 19:44   URINALYSIS, REFLEX TO URINE CULTURE - Abnormal; Notable for the following components:    Appearance, UA Hazy (*)     Protein, UA 1+ (*)     Occult Blood UA Trace (*)     Leukocytes, UA Trace (*)     All other components within normal limits    Narrative:     Specimen Source->Urine   URINALYSIS MICROSCOPIC - Abnormal; Notable for the following components:    RBC, UA 5 (*)     WBC, UA 6 (*)     Bacteria Few (*)     Hyaline Casts, UA 10 (*)     Trichomonas, UA Rare (*)     All other components within normal limits    Narrative:     Specimen Source->Urine   POTASSIUM - Abnormal; Notable for the following components:    Potassium 2.8 (*)     All other components within normal limits   LIPASE   MAGNESIUM   MAGNESIUM       EKG Readings: (Independently Interpreted)   Initial Reading: No STEMI. Rhythm: Normal Sinus Rhythm. Heart Rate: 62. Ectopy: No Ectopy. Axis: Normal. Other Impression: Flattening of T waves/No U waves noted / Abnormal EKG       Imaging Results    None          Medications - No data to display  Medical Decision Making:   Clinical Tests:   Lab Tests: Reviewed and Ordered  Radiological Study: Ordered and Reviewed  Medical Tests: Ordered and Reviewed  ED Management:  8:20 PM: Pt with excellent relief from meds. Potassium noted to be low at 2.5 and IV and PO treatment given. CT reveals mild Diverticulitis in Sigmoid consistent with presenting complaint. No arrythmia or other clinical concerns at present for the low K+ but will recheck prior to discharge.  Repeat potassium is 2.8. While still remains< 3.0, patient is very reliable for filling RX for Potassium and followup with PCP for  repeat level and advised strongly to do so.   Pain remains markedly improved and patient is very comfortable with discharge plans.  No clinical indicators noted for admission                      Clinical Impression:        ICD-10-CM ICD-9-CM   1. Diverticulitis of sigmoid colon  K57.32 562.11   2. Hypokalemia  E87.6 276.8               Lj Farah MD  05/14/22 0648

## 2022-09-21 DIAGNOSIS — M79.9 DISORDER OF SOFT TISSUE: Primary | ICD-10-CM

## 2022-09-21 DIAGNOSIS — Z48.815 ENCOUNTER FOR SURGICAL AFTERCARE FOLLOWING SURGERY OF DIGESTIVE SYSTEM: ICD-10-CM

## 2022-09-23 ENCOUNTER — HOSPITAL ENCOUNTER (OUTPATIENT)
Dept: RADIOLOGY | Facility: HOSPITAL | Age: 62
Discharge: HOME | End: 2022-09-23
Attending: NURSE PRACTITIONER
Payer: MEDICAID

## 2022-09-23 DIAGNOSIS — Z48.815 ENCOUNTER FOR SURGICAL AFTERCARE FOLLOWING SURGERY OF DIGESTIVE SYSTEM: ICD-10-CM

## 2022-09-23 DIAGNOSIS — M79.9 DISORDER OF SOFT TISSUE: ICD-10-CM

## 2022-09-23 PROCEDURE — 76705 ECHO EXAM OF ABDOMEN: CPT | Mod: TC,PO

## 2022-09-23 PROCEDURE — 76705 ECHO EXAM OF ABDOMEN: CPT | Mod: 26,,, | Performed by: RADIOLOGY

## 2022-09-23 PROCEDURE — 76705 US SOFT TISSUE, ABDOMEN: ICD-10-PCS | Mod: 26,,, | Performed by: RADIOLOGY

## 2022-11-13 ENCOUNTER — HOSPITAL ENCOUNTER (EMERGENCY)
Facility: HOSPITAL | Age: 62
Discharge: HOME | End: 2022-11-14
Attending: EMERGENCY MEDICINE
Payer: MEDICAID

## 2022-11-13 DIAGNOSIS — M25.512 ACUTE PAIN OF LEFT SHOULDER: Primary | ICD-10-CM

## 2022-11-13 DIAGNOSIS — M75.32 CALCIFIC TENDINITIS OF LEFT SHOULDER: ICD-10-CM

## 2022-11-13 PROCEDURE — 96372 THER/PROPH/DIAG INJ SC/IM: CPT | Performed by: EMERGENCY MEDICINE

## 2022-11-13 PROCEDURE — 63600175 PHARM REV CODE 636 W HCPCS: Mod: ER | Performed by: EMERGENCY MEDICINE

## 2022-11-13 PROCEDURE — 99284 EMERGENCY DEPT VISIT MOD MDM: CPT | Mod: ER

## 2022-11-13 RX ORDER — HYDROMORPHONE HYDROCHLORIDE 2 MG/ML
1 INJECTION, SOLUTION INTRAMUSCULAR; INTRAVENOUS; SUBCUTANEOUS
Status: COMPLETED | OUTPATIENT
Start: 2022-11-13 | End: 2022-11-13

## 2022-11-13 RX ORDER — PROMETHAZINE HYDROCHLORIDE 25 MG/ML
12.5 INJECTION, SOLUTION INTRAMUSCULAR; INTRAVENOUS
Status: COMPLETED | OUTPATIENT
Start: 2022-11-13 | End: 2022-11-13

## 2022-11-13 RX ADMIN — PROMETHAZINE HYDROCHLORIDE 12.5 MG: 25 INJECTION INTRAMUSCULAR; INTRAVENOUS at 11:11

## 2022-11-13 RX ADMIN — HYDROMORPHONE HYDROCHLORIDE 1 MG: 2 INJECTION INTRAMUSCULAR; INTRAVENOUS; SUBCUTANEOUS at 11:11

## 2022-11-14 VITALS
TEMPERATURE: 98 F | WEIGHT: 117.75 LBS | DIASTOLIC BLOOD PRESSURE: 88 MMHG | HEART RATE: 60 BPM | BODY MASS INDEX: 21.67 KG/M2 | SYSTOLIC BLOOD PRESSURE: 155 MMHG | RESPIRATION RATE: 19 BRPM | HEIGHT: 62 IN | OXYGEN SATURATION: 100 %

## 2022-11-14 RX ORDER — TRAMADOL HYDROCHLORIDE 50 MG/1
100 TABLET ORAL EVERY 12 HOURS PRN
Qty: 28 TABLET | Refills: 0 | OUTPATIENT
Start: 2022-11-14 | End: 2024-01-03

## 2022-11-14 NOTE — DISCHARGE INSTRUCTIONS
Sling as needed.      Medication as needed as prescribed.      You have calcium buildup in the tendons of the shoulder which I believe is causing your pain.      See an orthopedist as needed.

## 2022-11-14 NOTE — ED PROVIDER NOTES
Encounter Date: 11/13/2022       History     Chief Complaint   Patient presents with    Shoulder Pain     L shoulder pain, denies injury, onset yesterday     Significant underlying past medical history, reviewed.  She is now here for left shoulder pain onset yesterday without specific injury, recurring episodes of this in the past by report treated with an uncertain pain shot.  She has not seen an orthopedist about this.  No other joints involved.  She has apparently quit smoking, history of peripheral vascular disease leading to left leg amputation, walks with prosthesis for above-the-knee amputation.  No other complaints.  Not driving.    The history is provided by the patient. No  was used.   Review of patient's allergies indicates:  No Known Allergies  Past Medical History:   Diagnosis Date    Anemia     Chronic constipation     COPD (chronic obstructive pulmonary disease)     Cystic disease of breast     Diverticulitis     Hyperlipidemia     Hypertension     PAD (peripheral artery disease)      Past Surgical History:   Procedure Laterality Date    ABDOMINAL SURGERY      BREAST CYST EXCISION Right     CHOLECYSTECTOMY      HYSTERECTOMY      LEG AMPUTATION      L BKA    TOTAL ABDOMINAL HYSTERECTOMY W/ BILATERAL SALPINGOOPHORECTOMY       Family History   Problem Relation Age of Onset    Diabetes Mother     Hypertension Sister     Cancer Sister     Colon cancer Sister     Hypertension Father     Breast cancer Maternal Aunt      Social History     Tobacco Use    Smoking status: Every Day     Packs/day: 0.25     Years: 10.00     Pack years: 2.50     Types: Cigarettes     Last attempt to quit: 3/26/2012     Years since quitting: 10.6    Smokeless tobacco: Never    Tobacco comments:     quit 3 months ago   Substance Use Topics    Alcohol use: No     Alcohol/week: 0.0 standard drinks    Drug use: No     Review of Systems   Constitutional:  Negative for activity change, fatigue and fever.   HENT:   Negative for congestion, ear pain, facial swelling, nosebleeds, sinus pressure and sore throat.    Eyes:  Negative for pain, discharge, redness and visual disturbance.   Respiratory:  Negative for cough, choking, chest tightness, shortness of breath and wheezing.    Cardiovascular:  Negative for chest pain, palpitations and leg swelling.   Gastrointestinal:  Negative for abdominal distention, abdominal pain, nausea and vomiting.   Endocrine: Negative for heat intolerance, polydipsia and polyuria.   Genitourinary:  Negative for difficulty urinating, dysuria, flank pain, hematuria and urgency.   Musculoskeletal:  Negative for back pain, gait problem, joint swelling and myalgias.        Left shoulder pain, see history of present illness   Skin:  Negative for color change and rash.   Allergic/Immunologic: Negative for environmental allergies and food allergies.   Neurological:  Negative for dizziness, weakness, numbness and headaches.   Hematological:  Negative for adenopathy. Does not bruise/bleed easily.   Psychiatric/Behavioral:  Negative for agitation and behavioral problems. The patient is not nervous/anxious.    All other systems reviewed and are negative.    Physical Exam     Initial Vitals [11/13/22 2250]   BP Pulse Resp Temp SpO2   (!) 165/90 (!) 59 18 97.6 °F (36.4 °C) 100 %      MAP       --         Physical Exam    Nursing note and vitals reviewed.  Constitutional: She appears well-developed. She is not diaphoretic. She appears distressed.   Mildly chronically ill-appearing, underweight, mild-to-moderate acute discomfort from left shoulder pain.   HENT:   Head: Normocephalic and atraumatic.   Mouth/Throat: No oropharyngeal exudate.   Eyes: Conjunctivae and EOM are normal. Pupils are equal, round, and reactive to light. Right eye exhibits no discharge. Left eye exhibits no discharge. No scleral icterus.   Neck: Neck supple. No thyromegaly present. No tracheal deviation present. No JVD present.   Normal range  of motion.  Cardiovascular:  Normal rate, regular rhythm, normal heart sounds and intact distal pulses.     Exam reveals no gallop and no friction rub.       No murmur heard.  Pulmonary/Chest: No stridor. No respiratory distress. She has no wheezes. She has no rhonchi. She has no rales. She exhibits no tenderness.   Decreased air entry throughout   Abdominal: Abdomen is soft. Bowel sounds are normal. She exhibits no distension and no mass. There is no abdominal tenderness. There is no rebound and no guarding.   Musculoskeletal:         General: Tenderness present. No edema.      Cervical back: Normal range of motion and neck supple.      Comments: Old well-healed left above-the-knee amputation, prosthesis in place.  Resists range of motion testing of the left shoulder, only partially able to abduct or elevate the joint.  Diffusely tender without effusion, there is mild diffuse joint thickening.  No crepitus, erythema, warmth, or instability.  No other acute findings.     Neurological: She is alert and oriented to person, place, and time. She has normal strength.   Skin: Skin is warm and dry. No rash and no abscess noted. No erythema.   Psychiatric: She has a normal mood and affect. Her behavior is normal. Judgment and thought content normal.       ED Course   Procedures  Labs Reviewed - No data to display       Imaging Results              X-Ray Shoulder 2 or More Views Left (Final result)  Result time 11/13/22 23:59:55      Final result by Lisbet Golden MD (11/13/22 23:59:55)                   Impression:      No acute fracture or dislocation      Electronically signed by: Chico Frausto  Date:    11/13/2022  Time:    23:59               Narrative:    EXAMINATION:  XR SHOULDER COMPLETE 2 OR MORE VIEWS LEFT    CLINICAL HISTORY:  pain;    TECHNIQUE:  Two or three views of the left shoulder were performed.    COMPARISON:  None    FINDINGS:  No acute fracture or dislocation.  Question sub acromion spurring.  Slight  calcification in the rotator cuff consistent with rotator cuff tendinopathy.                                       Medications   HYDROmorphone (PF) injection 1 mg (1 mg Intramuscular Given 11/13/22 2346)   promethazine injection 12.5 mg (12.5 mg Intramuscular Given 11/13/22 2346)     12:09 AM Counseled re: calcific tendinitis; sling/ pain control/ ortho follow up.                             Clinical Impression:   Final diagnoses:  [M25.512] Acute pain of left shoulder (Primary)  [M75.32] Calcific tendinitis of left shoulder      ED Disposition Condition    Discharge Stable          ED Prescriptions       Medication Sig Dispense Start Date End Date Auth. Provider    traMADoL (ULTRAM) 50 mg tablet Take 2 tablets (100 mg total) by mouth every 12 (twelve) hours as needed for Pain. 28 tablet 11/14/2022 -- Luciano Hurley MD          Follow-up Information       Follow up With Specialties Details Why Contact Info    [facility]  As needed [address] [facility] [address]  [phone]      [facility] - Emergency Dept Emergency Medicine  As needed [address] [address] [id number] [phone]             Luciano Hurley MD  11/14/22 0009

## 2024-01-03 ENCOUNTER — HOSPITAL ENCOUNTER (EMERGENCY)
Facility: HOSPITAL | Age: 64
Discharge: HOME | End: 2024-01-03
Attending: EMERGENCY MEDICINE
Payer: MEDICAID

## 2024-01-03 VITALS
DIASTOLIC BLOOD PRESSURE: 81 MMHG | RESPIRATION RATE: 17 BRPM | HEART RATE: 68 BPM | WEIGHT: 134.94 LBS | BODY MASS INDEX: 24.83 KG/M2 | HEIGHT: 62 IN | SYSTOLIC BLOOD PRESSURE: 168 MMHG | OXYGEN SATURATION: 97 % | TEMPERATURE: 98 F

## 2024-01-03 DIAGNOSIS — M65.222 CALCIFIC TENDINITIS, LEFT UPPER ARM: Primary | ICD-10-CM

## 2024-01-03 PROCEDURE — 99284 EMERGENCY DEPT VISIT MOD MDM: CPT | Mod: ER

## 2024-01-03 PROCEDURE — 96372 THER/PROPH/DIAG INJ SC/IM: CPT | Performed by: EMERGENCY MEDICINE

## 2024-01-03 PROCEDURE — 63600175 PHARM REV CODE 636 W HCPCS: Mod: ER | Performed by: EMERGENCY MEDICINE

## 2024-01-03 RX ORDER — TRAMADOL HYDROCHLORIDE 50 MG/1
100 TABLET ORAL EVERY 12 HOURS PRN
Qty: 28 TABLET | Refills: 0 | Status: SHIPPED | OUTPATIENT
Start: 2024-01-03

## 2024-01-03 RX ORDER — HYDROMORPHONE HYDROCHLORIDE 2 MG/ML
1 INJECTION, SOLUTION INTRAMUSCULAR; INTRAVENOUS; SUBCUTANEOUS
Status: COMPLETED | OUTPATIENT
Start: 2024-01-03 | End: 2024-01-03

## 2024-01-03 RX ORDER — PROCHLORPERAZINE EDISYLATE 5 MG/ML
5 INJECTION INTRAMUSCULAR; INTRAVENOUS
Status: COMPLETED | OUTPATIENT
Start: 2024-01-03 | End: 2024-01-03

## 2024-01-03 RX ADMIN — HYDROMORPHONE HYDROCHLORIDE 1 MG: 2 INJECTION INTRAMUSCULAR; INTRAVENOUS; SUBCUTANEOUS at 09:01

## 2024-01-03 RX ADMIN — PROCHLORPERAZINE EDISYLATE 5 MG: 5 INJECTION INTRAMUSCULAR; INTRAVENOUS at 09:01

## 2024-01-04 NOTE — ED PROVIDER NOTES
Encounter Date: 1/3/2024       History     Chief Complaint   Patient presents with    Shoulder Pain     Started yesterday evening. Left shoulder pain radiates down arm. Worse with movement. No relief with meds at home. Denies injury or trauma.      She was here just about a year ago under nearly identical circumstances, atraumatic left shoulder pain, found due to likely calcific tendinitis, recommended for orthopedic follow-up.  She has never accomplished this but did get sustained relief with pain medicine as prescribed last year and she is asking for exactly that.  She reports she has successfully continued to be abstinent from cigarettes.  No other complaints.  Past history is as previously reviewed and currently documented.  Not driving.  No other joint complaints.  Remains ambulatory with prosthesis for left above the knee amputation.    The history is provided by the patient. No  was used.     Review of patient's allergies indicates:  No Known Allergies  Past Medical History:   Diagnosis Date    Anemia     Chronic constipation     COPD (chronic obstructive pulmonary disease)     Cystic disease of breast     Diverticulitis     Hyperlipidemia     Hypertension     PAD (peripheral artery disease)      Past Surgical History:   Procedure Laterality Date    ABDOMINAL SURGERY      BREAST CYST EXCISION Right     CHOLECYSTECTOMY      HYSTERECTOMY      LEG AMPUTATION      L BKA    TOTAL ABDOMINAL HYSTERECTOMY W/ BILATERAL SALPINGOOPHORECTOMY       Family History   Problem Relation Age of Onset    Diabetes Mother     Hypertension Sister     Cancer Sister     Colon cancer Sister     Hypertension Father     Breast cancer Maternal Aunt      Social History     Tobacco Use    Smoking status: Every Day     Current packs/day: 0.00     Average packs/day: 0.3 packs/day for 10.0 years (2.5 ttl pk-yrs)     Types: Cigarettes     Start date: 3/26/2002     Last attempt to quit: 3/26/2012     Years since quitting:  11.7    Smokeless tobacco: Never    Tobacco comments:     quit 3 months ago   Substance Use Topics    Alcohol use: No     Alcohol/week: 0.0 standard drinks of alcohol    Drug use: No     Review of Systems   Musculoskeletal:  Positive for arthralgias.       Physical Exam     Initial Vitals   BP Pulse Resp Temp SpO2   01/03/24 2034 01/03/24 2034 01/03/24 2034 01/03/24 2036 01/03/24 2034   (!) 174/83 64 17 97.8 °F (36.6 °C) 97 %      MAP       --                Physical Exam    Nursing note and vitals reviewed.  Constitutional: She appears well-developed and well-nourished. She is not diaphoretic. She appears distressed.   Mild discomfort   HENT:   Head: Normocephalic and atraumatic.   Mouth/Throat: No oropharyngeal exudate.   Eyes: Conjunctivae and EOM are normal. Pupils are equal, round, and reactive to light. Right eye exhibits no discharge. Left eye exhibits no discharge. No scleral icterus.   Neck: Neck supple. No thyromegaly present. No tracheal deviation present. No JVD present.   Normal range of motion.  Cardiovascular:  Normal rate, regular rhythm, normal heart sounds and intact distal pulses.     Exam reveals no gallop and no friction rub.       No murmur heard.  Pulmonary/Chest: Breath sounds normal. No stridor. No respiratory distress. She has no wheezes. She has no rhonchi. She has no rales. She exhibits no tenderness.   Abdominal: Abdomen is soft. Bowel sounds are normal. She exhibits no distension and no mass. There is no abdominal tenderness. There is no rebound and no guarding.   Musculoskeletal:         General: Tenderness present. No edema.      Cervical back: Normal range of motion and neck supple.      Comments: Prosthesis in place for left above-the-knee amputation.  Resists left shoulder range of motion testing, indicates generalized left upper arm and shoulder girdle tenderness.  No effusion, warmth, or crepitus.  Intact distal neurovascular and tendon exam of the left upper arm.  No other acute  findings.     Neurological: She is alert and oriented to person, place, and time. She has normal strength.   Skin: Skin is warm and dry. No rash and no abscess noted. No erythema.   Psychiatric: She has a normal mood and affect. Her behavior is normal. Judgment and thought content normal.         ED Course   Procedures  Labs Reviewed - No data to display       Imaging Results    None          Medications   HYDROmorphone (PF) injection 1 mg (has no administration in time range)   prochlorperazine injection Soln 5 mg (has no administration in time range)     Medical Decision Making  Problems Addressed:  Calcific tendinitis, left upper arm: chronic illness or injury    Risk  Prescription drug management.      Additional MDM:   Differential Diagnosis:   Calcific tendinitis/ other cause of shoulder pain                                    Clinical Impression:  Final diagnoses:  [M65.222] Calcific tendinitis, left upper arm (Primary)          ED Disposition Condition    Discharge Stable          ED Prescriptions       Medication Sig Dispense Start Date End Date Auth. Provider    traMADoL (ULTRAM) 50 mg tablet Take 2 tablets (100 mg total) by mouth every 12 (twelve) hours as needed for Pain. 28 tablet 1/3/2024 -- Luciano Hurley MD          Follow-up Information       Follow up With Specialties Details Why Contact Info    [facility] - Emergency Dept Emergency Medicine  As needed [address] [address] [id number] [phone]    [facility] F[facility]  As needed [address] [facility] [address]  [phone]               Luciano Hurley MD  01/03/24 5168

## 2024-01-04 NOTE — DISCHARGE INSTRUCTIONS
X-rays last year for this problem showed calcific tendinitis.      I again recommend that you see an orthopedic doctor to discuss long-term management.

## 2024-02-18 ENCOUNTER — HOSPITAL ENCOUNTER (EMERGENCY)
Facility: HOSPITAL | Age: 64
Discharge: HOME | End: 2024-02-18
Attending: EMERGENCY MEDICINE
Payer: MEDICAID

## 2024-02-18 VITALS
WEIGHT: 132.5 LBS | SYSTOLIC BLOOD PRESSURE: 182 MMHG | HEART RATE: 57 BPM | BODY MASS INDEX: 22.62 KG/M2 | DIASTOLIC BLOOD PRESSURE: 83 MMHG | RESPIRATION RATE: 19 BRPM | HEIGHT: 64 IN | OXYGEN SATURATION: 99 % | TEMPERATURE: 98 F

## 2024-02-18 DIAGNOSIS — B02.9 HERPES ZOSTER WITHOUT COMPLICATION: Primary | ICD-10-CM

## 2024-02-18 DIAGNOSIS — R21 RASH: ICD-10-CM

## 2024-02-18 PROCEDURE — 99284 EMERGENCY DEPT VISIT MOD MDM: CPT | Mod: ER

## 2024-02-18 RX ORDER — HYDROCODONE BITARTRATE AND ACETAMINOPHEN 5; 325 MG/1; MG/1
1 TABLET ORAL EVERY 6 HOURS PRN
Qty: 12 TABLET | Refills: 0 | Status: SHIPPED | OUTPATIENT
Start: 2024-02-18 | End: 2024-04-10

## 2024-02-18 RX ORDER — VALACYCLOVIR HYDROCHLORIDE 1 G/1
1000 TABLET, FILM COATED ORAL 3 TIMES DAILY
Qty: 21 TABLET | Refills: 0 | Status: SHIPPED | OUTPATIENT
Start: 2024-02-18 | End: 2024-02-25

## 2024-02-18 NOTE — ED PROVIDER NOTES
Encounter Date: 2024       History     Chief Complaint   Patient presents with    Rash     L arm and back.     The history is provided by the patient.   Rash   This is a new problem. The current episode started two days ago. The problem has been gradually worsening. The problem is associated with an unknown factor. The rash is present on the left arm. The pain is at a severity of 8/10. The pain has been Constant since onset. Associated symptoms include itching and pain. She has tried nothing for the symptoms. The treatment provided no relief.     Review of patient's allergies indicates:  No Known Allergies  Past Medical History:   Diagnosis Date    Anemia     Chronic constipation     COPD (chronic obstructive pulmonary disease)     Cystic disease of breast     Diverticulitis     Hyperlipidemia     Hypertension     PAD (peripheral artery disease)      Past Surgical History:   Procedure Laterality Date    ABDOMINAL SURGERY      BREAST CYST EXCISION Right     CHOLECYSTECTOMY      HYSTERECTOMY      LEG AMPUTATION      L BKA    TOTAL ABDOMINAL HYSTERECTOMY W/ BILATERAL SALPINGOOPHORECTOMY       Family History   Problem Relation Age of Onset    Diabetes Mother     Hypertension Sister     Cancer Sister     Colon cancer Sister     Hypertension Father     Breast cancer Maternal Aunt      Social History     Tobacco Use    Smoking status: Every Day     Current packs/day: 0.00     Average packs/day: 0.3 packs/day for 10.0 years (2.5 ttl pk-yrs)     Types: Cigarettes     Start date: 3/26/2002     Last attempt to quit: 3/26/2012     Years since quittin.9    Smokeless tobacco: Never    Tobacco comments:     quit 3 months ago   Substance Use Topics    Alcohol use: No     Alcohol/week: 0.0 standard drinks of alcohol    Drug use: No     Review of Systems   Constitutional:  Negative for fever.   HENT:  Negative for sore throat.    Respiratory:  Negative for shortness of breath.    Cardiovascular:  Negative for chest pain.    Gastrointestinal:  Negative for nausea.   Genitourinary:  Negative for dysuria.   Musculoskeletal:  Negative for back pain.   Skin:  Positive for itching and rash.   Neurological:  Negative for weakness.   Hematological:  Does not bruise/bleed easily.       Physical Exam     Initial Vitals [02/18/24 1027]   BP Pulse Resp Temp SpO2   (!) 182/83 (!) 57 19 97.7 °F (36.5 °C) 99 %      MAP       --         Physical Exam    Nursing note and vitals reviewed.  Constitutional: She appears well-developed and well-nourished. No distress.   HENT:   Head: Normocephalic and atraumatic.   Mouth/Throat: Oropharynx is clear and moist.   Eyes: Conjunctivae and EOM are normal. Pupils are equal, round, and reactive to light.   Neck: Neck supple.   Normal range of motion.  Cardiovascular:  Normal rate, regular rhythm and normal heart sounds.           Pulmonary/Chest: Breath sounds normal. No respiratory distress.   Abdominal: Abdomen is soft. Bowel sounds are normal. She exhibits no distension. There is no abdominal tenderness.   Musculoskeletal:         General: Normal range of motion.      Cervical back: Normal range of motion and neck supple.     Neurological: She is alert and oriented to person, place, and time. She has normal strength.   Skin: Skin is warm and dry. Rash noted.   Left upper extremity dermatomal distribution of erythematous MP rash with occaisional vesicles   Psychiatric: She has a normal mood and affect. Thought content normal.         ED Course   Procedures  Labs Reviewed - No data to display       Imaging Results    None     11:06 AM - Counseling: Spoke with the patient and discussed todays findings, in addition to providing specific details for the plan of care and counseling regarding the diagnosis and prognosis. Questions are answered at this time.        Medications - No data to display  Medical Decision Making  Pt with painful erythematous MP rash in dermatomal pattern for 2 days    Problems  Addressed:  Herpes zoster without complication: acute illness or injury  Rash: acute illness or injury    Risk  Prescription drug management.                                      Clinical Impression:  Final diagnoses:  [B02.9] Herpes zoster without complication (Primary)  [R21] Rash          ED Disposition Condition    Discharge Stable          ED Prescriptions       Medication Sig Dispense Start Date End Date Auth. Provider    HYDROcodone-acetaminophen (NORCO) 5-325 mg per tablet Take 1 tablet by mouth every 6 (six) hours as needed. 12 tablet 2/18/2024 -- Antonio Sorenson MD    valACYclovir (VALTREX) 1000 MG tablet Take 1 tablet (1,000 mg total) by mouth 3 (three) times daily. for 7 days 21 tablet 2/18/2024 2/25/2024 Antonio Sorenson MD          Follow-up Information       Follow up With Specialties Details Why Contact Info    [facility] FNP [facility] Schedule an appointment as soon as possible for a visit in 2 days  [address] [place] [id number]  [phone]      [facility] - Emergency Dept Emergency Medicine  If symptoms worsen [address] [address] [phone]             Antonio Sorenson MD  02/18/24 2391

## 2024-03-03 ENCOUNTER — HOSPITAL ENCOUNTER (EMERGENCY)
Facility: HOSPITAL | Age: 64
Discharge: HOME | End: 2024-03-03
Attending: EMERGENCY MEDICINE
Payer: MEDICAID

## 2024-03-03 VITALS
HEART RATE: 69 BPM | OXYGEN SATURATION: 99 % | HEIGHT: 64 IN | WEIGHT: 147.69 LBS | DIASTOLIC BLOOD PRESSURE: 72 MMHG | RESPIRATION RATE: 20 BRPM | BODY MASS INDEX: 25.21 KG/M2 | TEMPERATURE: 98 F | SYSTOLIC BLOOD PRESSURE: 170 MMHG

## 2024-03-03 DIAGNOSIS — R21 RASH: Primary | ICD-10-CM

## 2024-03-03 PROCEDURE — 25000003 PHARM REV CODE 250: Mod: ER | Performed by: EMERGENCY MEDICINE

## 2024-03-03 PROCEDURE — 99283 EMERGENCY DEPT VISIT LOW MDM: CPT | Mod: ER

## 2024-03-03 PROCEDURE — 63600175 PHARM REV CODE 636 W HCPCS: Mod: ER | Performed by: EMERGENCY MEDICINE

## 2024-03-03 RX ORDER — PREDNISONE 20 MG/1
40 TABLET ORAL
Status: COMPLETED | OUTPATIENT
Start: 2024-03-03 | End: 2024-03-03

## 2024-03-03 RX ORDER — LEVOFLOXACIN 750 MG/1
750 TABLET ORAL
Status: DISCONTINUED | OUTPATIENT
Start: 2024-03-03 | End: 2024-03-03

## 2024-03-03 RX ORDER — METHYLPREDNISOLONE 4 MG/1
TABLET ORAL
Qty: 1 EACH | Refills: 0 | Status: SHIPPED | OUTPATIENT
Start: 2024-03-03

## 2024-03-03 RX ORDER — DIPHENHYDRAMINE HCL 25 MG
25 CAPSULE ORAL
Status: COMPLETED | OUTPATIENT
Start: 2024-03-03 | End: 2024-03-03

## 2024-03-03 RX ADMIN — PREDNISONE 40 MG: 20 TABLET ORAL at 11:03

## 2024-03-03 RX ADMIN — DIPHENHYDRAMINE HYDROCHLORIDE 25 MG: 25 CAPSULE ORAL at 11:03

## 2024-03-03 NOTE — ED PROVIDER NOTES
Encounter Date: 3/3/2024       History     Chief Complaint   Patient presents with    swollen lymph nodes on neck     Pt states completed last dose of med for shingles last night but concerned with knots on right side neck since yest.     She is here for skin rash.  See most recent ER visit, diagnosed with shingles of the left upper extremity, completed Valtrex as prescribed.  Those lesions have healed well, but she now has what she believes to be a different rash involving many more skin areas, see description below.  She is suspicious that it may be from flea bites or similar, she goes to a friend's house that she describes as filthy and with cats who she believes have fleas.  Itchy, scratching at it, no other complaints.    The history is provided by the patient. No  was used.     Review of patient's allergies indicates:  No Known Allergies  Past Medical History:   Diagnosis Date    Anemia     Chronic constipation     COPD (chronic obstructive pulmonary disease)     Cystic disease of breast     Diverticulitis     Hyperlipidemia     Hypertension     PAD (peripheral artery disease)      Past Surgical History:   Procedure Laterality Date    ABDOMINAL SURGERY      BREAST CYST EXCISION Right     CHOLECYSTECTOMY      HYSTERECTOMY      LEG AMPUTATION      L BKA    TOTAL ABDOMINAL HYSTERECTOMY W/ BILATERAL SALPINGOOPHORECTOMY       Family History   Problem Relation Age of Onset    Diabetes Mother     Hypertension Sister     Cancer Sister     Colon cancer Sister     Hypertension Father     Breast cancer Maternal Aunt      Social History     Tobacco Use    Smoking status: Every Day     Current packs/day: 0.00     Average packs/day: 0.3 packs/day for 10.0 years (2.5 ttl pk-yrs)     Types: Cigarettes     Start date: 3/26/2002     Last attempt to quit: 3/26/2012     Years since quittin.9    Smokeless tobacco: Never    Tobacco comments:     quit 3 months ago   Substance Use Topics    Alcohol use: No      Alcohol/week: 0.0 standard drinks of alcohol    Drug use: No     Review of Systems   Constitutional:  Negative for activity change, fatigue and fever.   HENT:  Negative for congestion, ear pain, facial swelling, nosebleeds, sinus pressure and sore throat.    Eyes:  Negative for pain, discharge, redness and visual disturbance.   Respiratory:  Negative for cough, choking, chest tightness, shortness of breath and wheezing.    Cardiovascular:  Negative for chest pain, palpitations and leg swelling.   Gastrointestinal:  Negative for abdominal distention, abdominal pain, nausea and vomiting.   Endocrine: Negative for heat intolerance, polydipsia and polyuria.   Genitourinary:  Negative for difficulty urinating, dysuria, flank pain, hematuria and urgency.   Musculoskeletal:  Negative for back pain, gait problem, joint swelling and myalgias.   Skin:  Positive for rash. Negative for color change.   Allergic/Immunologic: Negative for environmental allergies and food allergies.   Neurological:  Negative for dizziness, weakness, numbness and headaches.   Hematological:  Negative for adenopathy. Does not bruise/bleed easily.   Psychiatric/Behavioral:  Negative for agitation and behavioral problems. The patient is not nervous/anxious.    All other systems reviewed and are negative.      Physical Exam     Initial Vitals [03/03/24 1101]   BP Pulse Resp Temp SpO2   (!) 170/72 69 20 98 °F (36.7 °C) 99 %      MAP       --         Physical Exam    Nursing note and vitals reviewed.  Constitutional: She appears well-developed and well-nourished. She is not diaphoretic. No distress.   HENT:   Head: Normocephalic and atraumatic.   Mouth/Throat: No oropharyngeal exudate.   Eyes: Conjunctivae and EOM are normal. Pupils are equal, round, and reactive to light. Right eye exhibits no discharge. Left eye exhibits no discharge. No scleral icterus.   Neck: Neck supple. No thyromegaly present. No tracheal deviation present. No JVD present.    Normal range of motion.  Cardiovascular:  Normal rate, regular rhythm, normal heart sounds and intact distal pulses.     Exam reveals no gallop and no friction rub.       No murmur heard.  Pulmonary/Chest: Breath sounds normal. No stridor. No respiratory distress. She has no wheezes. She has no rhonchi. She has no rales. She exhibits no tenderness.   Abdominal: Abdomen is soft. Bowel sounds are normal. She exhibits no distension and no mass. There is no abdominal tenderness. There is no rebound and no guarding.   Musculoskeletal:         General: No tenderness or edema. Normal range of motion.      Cervical back: Normal range of motion and neck supple.     Neurological: She is alert and oriented to person, place, and time. She has normal strength.   Skin: Skin is warm and dry. Rash noted. No abscess noted. No erythema.   Previous areas of diagnosed shingles on the left upper extremity appear to have healed well without sequela.  She now has small scattered erythematous slightly raised pruritic lesions scattered about both upper extremities, the back of her neck, proximal thighs, and to a lesser degree upper back and chest.  Exam is nonspecific but suggests arthropod bites such as fleas.  Some evidence of mild self excoriation.  No evidence of secondary infection.  Not consistent with localized shingles, consider also viral exanthem or some other form of mild diffuse rash.   Psychiatric: She has a normal mood and affect. Her behavior is normal. Judgment and thought content normal.         ED Course   Procedures  Labs Reviewed - No data to display       Imaging Results    None          Medications   diphenhydrAMINE capsule 25 mg (25 mg Oral Given 3/3/24 1135)   predniSONE tablet 40 mg (40 mg Oral Given 3/3/24 1135)     Medical Decision Making  Problems Addressed:  Rash: acute illness or injury    Risk  OTC drugs.  Prescription drug management.      Additional MDM:   Differential Diagnosis:   Zoster, arthropod bite,  viral exanthem, allergic reaction, other cause of skin rash.                                    Clinical Impression:  Final diagnoses:  [R21] Rash (Primary)          ED Disposition Condition    Discharge Stable          ED Prescriptions       Medication Sig Dispense Start Date End Date Auth. Provider    methylPREDNISolone (MEDROL DOSEPACK) 4 mg tablet As per package label 1 each 3/3/2024 -- Luciano Hurley MD          Follow-up Information       Follow up With Specialties Details Why Contact Info    [facility] - Emergency Dept Emergency Medicine  As needed [address] [address] [id number] [phone]    Kervin Faust FNP Family Medicine Schedule an appointment as soon as possible for a visit   [address] [facility] [address] [phone]               Luciano Hurley MD  03/03/24 [id number]

## 2024-03-03 NOTE — DISCHARGE INSTRUCTIONS
As discussed, this rash appears to be bug bites or similar.      Take the steroids as prescribed, take also Benadryl or Claritin as labeled as needed for itching, have your doctor recheck later this week.      Return as needed or if worse.

## 2024-04-10 ENCOUNTER — HOSPITAL ENCOUNTER (EMERGENCY)
Facility: HOSPITAL | Age: 64
Discharge: HOME | End: 2024-04-10
Attending: EMERGENCY MEDICINE
Payer: MEDICAID

## 2024-04-10 VITALS
SYSTOLIC BLOOD PRESSURE: 129 MMHG | TEMPERATURE: 99 F | DIASTOLIC BLOOD PRESSURE: 63 MMHG | RESPIRATION RATE: 19 BRPM | HEART RATE: 76 BPM | WEIGHT: 133.69 LBS | OXYGEN SATURATION: 98 % | BODY MASS INDEX: 22.95 KG/M2

## 2024-04-10 DIAGNOSIS — M25.552 LEFT HIP PAIN: Primary | ICD-10-CM

## 2024-04-10 PROCEDURE — 99284 EMERGENCY DEPT VISIT MOD MDM: CPT | Mod: 25,ER

## 2024-04-10 RX ORDER — TRAMADOL HYDROCHLORIDE 50 MG/1
50 TABLET ORAL EVERY 6 HOURS PRN
Qty: 12 TABLET | Refills: 0 | Status: SHIPPED | OUTPATIENT
Start: 2024-04-10 | End: 2024-04-13

## 2024-04-11 NOTE — ED PROVIDER NOTES
"Encounter Date: 4/10/2024       History     Chief Complaint   Patient presents with    Hip Pain     "Cold" in left hip. Was in shoulder. Began Monday.        Hip Pain  This is a new problem. The current episode started 2 days ago (Patient presents to ER for left hip pain, onset 2 days ago.  Denies any known injury.  Pain is worse with walking.  Denies erythema, joint immobility, joint instability, numbness, tingling, erythema, bruising.). The problem occurs constantly. The problem has not changed since onset.Pertinent negatives include no chest pain, no abdominal pain, no headaches and no shortness of breath. The symptoms are aggravated by walking. The symptoms are relieved by rest.     Review of patient's allergies indicates:  No Known Allergies  Past Medical History:   Diagnosis Date    Anemia     Chronic constipation     COPD (chronic obstructive pulmonary disease)     Cystic disease of breast     Diverticulitis     Hyperlipidemia     Hypertension     PAD (peripheral artery disease)      Past Surgical History:   Procedure Laterality Date    ABDOMINAL SURGERY      BREAST CYST EXCISION Right     CHOLECYSTECTOMY      HYSTERECTOMY      LEG AMPUTATION      L BKA    TOTAL ABDOMINAL HYSTERECTOMY W/ BILATERAL SALPINGOOPHORECTOMY       Family History   Problem Relation Age of Onset    Diabetes Mother     Hypertension Sister     Cancer Sister     Colon cancer Sister     Hypertension Father     Breast cancer Maternal Aunt      Social History     Tobacco Use    Smoking status: Every Day     Current packs/day: 0.00     Average packs/day: 0.3 packs/day for 10.0 years (2.5 ttl pk-yrs)     Types: Cigarettes     Start date: 3/26/2002     Last attempt to quit: 3/26/2012     Years since quittin.0    Smokeless tobacco: Never    Tobacco comments:     quit 3 months ago   Substance Use Topics    Alcohol use: No     Alcohol/week: 0.0 standard drinks of alcohol    Drug use: No     Review of Systems   Constitutional:  Negative for " chills, fatigue and fever.   Respiratory:  Negative for cough and shortness of breath.    Cardiovascular:  Negative for chest pain.   Gastrointestinal:  Negative for abdominal pain, nausea and vomiting.   Genitourinary:  Negative for dysuria.   Musculoskeletal:  Negative for back pain, myalgias and neck pain.        +left hip pain   Skin:  Negative for rash and wound.   Neurological:  Negative for weakness, numbness and headaches.   All other systems reviewed and are negative.      Physical Exam     Initial Vitals [04/10/24 1700]   BP Pulse Resp Temp SpO2   129/63 76 19 98.7 °F (37.1 °C) 98 %      MAP       --         Physical Exam    Nursing note and vitals reviewed.  Constitutional: She is not diaphoretic. She is cooperative.  Non-toxic appearance. She does not have a sickly appearance. No distress.   HENT:   Head: Normocephalic and atraumatic.   Neck: Neck supple.   Normal range of motion.  Cardiovascular:  Normal rate, regular rhythm and intact distal pulses.           Pulmonary/Chest: Breath sounds normal. No respiratory distress.   Abdominal: Abdomen is soft. There is no abdominal tenderness.   Musculoskeletal:         General: Normal range of motion.      Cervical back: Normal range of motion and neck supple.      Left hip: Tenderness present. No deformity. Normal range of motion.      Left upper leg: Normal.     Neurological: She is alert and oriented to person, place, and time. She has normal strength. No sensory deficit. GCS score is 15. GCS eye subscore is 4. GCS verbal subscore is 5. GCS motor subscore is 6.   Skin: Skin is warm and dry.         ED Course   Procedures  Labs Reviewed - No data to display       Imaging Results              X-Ray Hip 2 or 3 views Left (with Pelvis when performed) (Final result)  Result time 04/10/24 17:51:42      Final result by Chico Frausto MD (04/10/24 17:51:42)                   Impression:      No acute fracture dislocation      Electronically signed by: Chico  Lisbet  Date:    04/10/2024  Time:    17:51               Narrative:    EXAMINATION:  XR HIP WITH PELVIS WHEN PERFORMED, 2 OR 3 VIEWS LEFT    CLINICAL HISTORY:  Left hip pain;    TECHNIQUE:  AP view of the pelvis and frog leg lateral view of the left hip were performed.    COMPARISON:  None    FINDINGS:  No fracture or dislocation.  No definite soft tissue abnormality.  Normal bone mineral density.  Mesh abdominal wall repair noted.  Moderate amount of stool in the colon.  Calcification along the greater tuberosity.  Anastomotic sutures.                                       Medications - No data to display  Medical Decision Making  Amount and/or Complexity of Data Reviewed  Radiology: ordered.    Risk  Prescription drug management.                     I discussed with patient that evaluation in the ED does not suggest any emergent or life threatening medical conditions requiring immediate intervention beyond what was provided in the ED, and I believe patient is safe for discharge. Regardless, an unremarkable evaluation in the ED does not preclude the development or presence of a serious of life threatening condition. As such, patient was instructed to return immediately for any worsening or change in current symptoms.                   Clinical Impression:  Final diagnoses:  [M25.552] Left hip pain (Primary)          ED Disposition Condition    Discharge Stable          ED Prescriptions       Medication Sig Dispense Start Date End Date Auth. Provider    traMADoL (ULTRAM) 50 mg tablet Take 1 tablet (50 mg total) by mouth every 6 (six) hours as needed for Pain. 12 tablet 4/10/2024 4/13/2024 Lauro Mariano NP          Follow-up Information       Follow up With Specialties Details Why Contact Info    Kervin Faust FNP Family Medicine In 2 days  [address] [facility] [id number] [phone]      [facility] - Emergency Dept Emergency Medicine  As needed, If symptoms worsen [address]  [facility] [address]  [phone]             Lauro Mariano, NP  04/10/24 1925

## 2024-12-15 ENCOUNTER — HOSPITAL ENCOUNTER (EMERGENCY)
Facility: HOSPITAL | Age: 64
Discharge: HOME | End: 2024-12-15
Attending: EMERGENCY MEDICINE
Payer: MEDICAID

## 2024-12-15 VITALS
BODY MASS INDEX: 23.98 KG/M2 | WEIGHT: 130.31 LBS | SYSTOLIC BLOOD PRESSURE: 144 MMHG | DIASTOLIC BLOOD PRESSURE: 75 MMHG | HEIGHT: 62 IN | HEART RATE: 74 BPM | OXYGEN SATURATION: 100 % | RESPIRATION RATE: 18 BRPM | TEMPERATURE: 98 F

## 2024-12-15 DIAGNOSIS — M25.512 ACUTE PAIN OF LEFT SHOULDER: Primary | ICD-10-CM

## 2024-12-15 PROCEDURE — 99284 EMERGENCY DEPT VISIT MOD MDM: CPT | Mod: ER

## 2024-12-15 RX ORDER — HYDROCODONE BITARTRATE AND ACETAMINOPHEN 10; 325 MG/1; MG/1
1 TABLET ORAL EVERY 6 HOURS PRN
COMMUNITY

## 2024-12-15 RX ORDER — TRAMADOL HYDROCHLORIDE 50 MG/1
50 TABLET ORAL EVERY 8 HOURS PRN
Qty: 12 TABLET | Refills: 0 | Status: SHIPPED | OUTPATIENT
Start: 2024-12-15

## 2024-12-15 RX ORDER — PREDNISONE 50 MG/1
50 TABLET ORAL DAILY
Qty: 5 TABLET | Refills: 0 | Status: SHIPPED | OUTPATIENT
Start: 2024-12-15 | End: 2024-12-20

## 2024-12-15 NOTE — ED PROVIDER NOTES
"Encounter Date: 12/15/2024       History     Chief Complaint   Patient presents with    Shoulder Pain     "Cold" in left shoudler began Friday. Stiffness. Pms intact in left hand distal to complaint. Painful to move.was taking hydrocodone which gave relief but ran out.      64-year-old female with complaint of left shoulder pain over the past couple days.  Patient reports stiffness in left shoulder.  Patient denies fall.  Denies injury.  Patient reports pain radiates from left shoulder to left arm.  Patient reports history of similar pain in past.        Review of patient's allergies indicates:  No Known Allergies  Past Medical History:   Diagnosis Date    Anemia     Chronic constipation     COPD (chronic obstructive pulmonary disease)     Cystic disease of breast     Diverticulitis     Hyperlipidemia     Hypertension     PAD (peripheral artery disease)      Past Surgical History:   Procedure Laterality Date    ABDOMINAL SURGERY      BREAST CYST EXCISION Right     CHOLECYSTECTOMY      HYSTERECTOMY      LEG AMPUTATION      L BKA    TOTAL ABDOMINAL HYSTERECTOMY W/ BILATERAL SALPINGOOPHORECTOMY       Family History   Problem Relation Name Age of Onset    Diabetes Mother      Hypertension Sister      Cancer Sister      Colon cancer Sister      Hypertension Father      Breast cancer Maternal Aunt       Social History     Tobacco Use    Smoking status: Every Day     Current packs/day: 0.00     Average packs/day: 0.3 packs/day for 10.0 years (2.5 ttl pk-yrs)     Types: Cigarettes     Start date: 3/26/2002     Last attempt to quit: 3/26/2012     Years since quittin.7    Smokeless tobacco: Never    Tobacco comments:     quit 3 months ago   Substance Use Topics    Alcohol use: No     Alcohol/week: 0.0 standard drinks of alcohol    Drug use: No     Review of Systems   Constitutional:  Negative for fever.   HENT:  Negative for sore throat.    Respiratory:  Negative for shortness of breath.    Cardiovascular:  Negative for " chest pain.   Gastrointestinal:  Negative for nausea.   Genitourinary:  Negative for dysuria.   Musculoskeletal:  Negative for back pain.        Left shoulder pain    Skin:  Negative for rash.   Neurological:  Negative for weakness.   Hematological:  Does not bruise/bleed easily.       Physical Exam     Initial Vitals   BP Pulse Resp Temp SpO2   12/15/24 1612 12/15/24 1612 12/15/24 1611 12/15/24 1611 12/15/24 1612   (!) 144/75 74 18 98.2 °F (36.8 °C) 100 %      MAP       --                Physical Exam    Nursing note and vitals reviewed.  Constitutional: She appears well-developed and well-nourished.   HENT:   Head: Normocephalic and atraumatic.   Eyes: Conjunctivae and EOM are normal. Pupils are equal, round, and reactive to light.   Neck: Neck supple.   Normal range of motion.  Cardiovascular:  Normal rate, regular rhythm, normal heart sounds and intact distal pulses.           Pulmonary/Chest: Breath sounds normal.   Abdominal: Abdomen is soft. There is no abdominal tenderness. There is no rebound and no guarding.   Musculoskeletal:         General: Normal range of motion.      Cervical back: Normal range of motion and neck supple.      Comments: Left lateral shoulder tenderness, pain with ROM of left shoulder, 2+ left radial pulse     Neurological: She is alert and oriented to person, place, and time. She has normal strength and normal reflexes.   Skin: Skin is warm and dry.   Psychiatric: She has a normal mood and affect. Her behavior is normal. Thought content normal.         ED Course   Procedures  Labs Reviewed - No data to display       Imaging Results    None          Medications - No data to display  Medical Decision Making                                    Clinical Impression:  Final diagnoses:  [M25.512] Acute pain of left shoulder (Primary)          ED Disposition Condition    Discharge Stable          ED Prescriptions       Medication Sig Dispense Start Date End Date Auth. Provider    traMADoL  (ULTRAM) 50 mg tablet Take 1 tablet (50 mg total) by mouth every 8 (eight) hours as needed for Pain. 12 tablet 12/15/2024 -- Gilles Davila NP    predniSONE (DELTASONE) 50 MG Tab Take 1 tablet (50 mg total) by mouth once daily. for 5 days 5 tablet 12/15/2024 12/20/2024 Gilles Davila NP          Follow-up Information       Follow up With Specialties Details Why Contact Info    Kervin Faust FNP Family Medicine Schedule an appointment as soon as possible for a visit in 2 days  [address] [place] [id number]  [phone]               Gilles Davila NP  12/15/24 1629       Gilles Davila NP  12/15/24 1621

## 2025-04-10 ENCOUNTER — HOSPITAL ENCOUNTER (OUTPATIENT)
Dept: RADIOLOGY | Facility: HOSPITAL | Age: 65
Discharge: HOME | End: 2025-04-10
Attending: INTERNAL MEDICINE
Payer: MEDICAID

## 2025-04-10 DIAGNOSIS — R10.13 EPIGASTRIC PAIN: ICD-10-CM

## 2025-04-10 PROCEDURE — 76700 US EXAM ABDOM COMPLETE: CPT | Mod: 26,,, | Performed by: RADIOLOGY

## 2025-04-10 PROCEDURE — 76700 US EXAM ABDOM COMPLETE: CPT | Mod: TC,PO

## 2025-05-15 ENCOUNTER — HOSPITAL ENCOUNTER (OUTPATIENT)
Dept: RADIOLOGY | Facility: HOSPITAL | Age: 65
Discharge: HOME | End: 2025-05-15
Attending: INTERNAL MEDICINE
Payer: MEDICAID

## 2025-05-15 DIAGNOSIS — R10.32 LEFT LOWER QUADRANT PAIN: ICD-10-CM

## 2025-05-15 PROCEDURE — A9698 NON-RAD CONTRAST MATERIALNOC: HCPCS | Mod: PO

## 2025-05-15 PROCEDURE — 74177 CT ABD & PELVIS W/CONTRAST: CPT | Mod: 26,,, | Performed by: RADIOLOGY

## 2025-05-15 PROCEDURE — 74177 CT ABD & PELVIS W/CONTRAST: CPT | Mod: TC,PO

## 2025-05-15 PROCEDURE — 25500020 PHARM REV CODE 255: Mod: PO

## 2025-05-15 RX ADMIN — IOHEXOL 1000 ML: 12 SOLUTION ORAL at 12:05

## 2025-05-15 RX ADMIN — IOHEXOL 75 ML: 350 INJECTION, SOLUTION INTRAVENOUS at 12:05

## 2025-06-07 ENCOUNTER — HOSPITAL ENCOUNTER (EMERGENCY)
Facility: HOSPITAL | Age: 65
Discharge: HOME | End: 2025-06-07
Attending: EMERGENCY MEDICINE
Payer: MEDICAID

## 2025-06-07 VITALS
RESPIRATION RATE: 18 BRPM | HEART RATE: 56 BPM | SYSTOLIC BLOOD PRESSURE: 146 MMHG | TEMPERATURE: 98 F | HEIGHT: 63 IN | DIASTOLIC BLOOD PRESSURE: 73 MMHG | OXYGEN SATURATION: 97 % | BODY MASS INDEX: 23.2 KG/M2 | WEIGHT: 130.94 LBS

## 2025-06-07 DIAGNOSIS — R07.9 CHEST PAIN, UNSPECIFIED TYPE: Primary | ICD-10-CM

## 2025-06-07 DIAGNOSIS — M79.671 RIGHT FOOT PAIN: ICD-10-CM

## 2025-06-07 DIAGNOSIS — R51.9 ACUTE NONINTRACTABLE HEADACHE, UNSPECIFIED HEADACHE TYPE: ICD-10-CM

## 2025-06-07 LAB
ABSOLUTE EOSINOPHIL (OHS): 0.28 K/UL
ABSOLUTE MONOCYTE (OHS): 0.41 K/UL (ref 0.3–1)
ABSOLUTE NEUTROPHIL COUNT (OHS): 2.82 K/UL (ref 1.8–7.7)
ALBUMIN SERPL BCP-MCNC: 4 G/DL (ref 3.5–5.2)
ALP SERPL-CCNC: 96 UNIT/L (ref 40–150)
ALT SERPL W/O P-5'-P-CCNC: 12 UNIT/L (ref 10–44)
ANION GAP (OHS): 10 MMOL/L (ref 8–16)
ANION GAP (OHS): 12 MMOL/L (ref 8–16)
AST SERPL-CCNC: 22 UNIT/L (ref 11–45)
BASOPHILS # BLD AUTO: 0.05 K/UL
BASOPHILS NFR BLD AUTO: 0.9 %
BILIRUB SERPL-MCNC: 0.3 MG/DL (ref 0.1–1)
BNP SERPL-MCNC: 22 PG/ML (ref 0–99)
BUN SERPL-MCNC: 8 MG/DL (ref 8–23)
BUN SERPL-MCNC: 9 MG/DL (ref 8–23)
CALCIUM SERPL-MCNC: 9.4 MG/DL (ref 8.7–10.5)
CALCIUM SERPL-MCNC: 9.5 MG/DL (ref 8.7–10.5)
CHLORIDE SERPL-SCNC: 107 MMOL/L (ref 95–110)
CHLORIDE SERPL-SCNC: 109 MMOL/L (ref 95–110)
CO2 SERPL-SCNC: 18 MMOL/L (ref 23–29)
CO2 SERPL-SCNC: 20 MMOL/L (ref 23–29)
CREAT SERPL-MCNC: 1.1 MG/DL (ref 0.5–1.4)
CREAT SERPL-MCNC: 1.2 MG/DL (ref 0.5–1.4)
ERYTHROCYTE [DISTWIDTH] IN BLOOD BY AUTOMATED COUNT: 14.6 % (ref 11.5–14.5)
GFR SERPLBLD CREATININE-BSD FMLA CKD-EPI: 51 ML/MIN/1.73/M2
GFR SERPLBLD CREATININE-BSD FMLA CKD-EPI: 56 ML/MIN/1.73/M2
GLUCOSE SERPL-MCNC: 85 MG/DL (ref 70–110)
GLUCOSE SERPL-MCNC: 86 MG/DL (ref 70–110)
HCT VFR BLD AUTO: 46.6 % (ref 37–48.5)
HGB BLD-MCNC: 15.1 GM/DL (ref 12–16)
HOLD SPECIMEN: NORMAL
IMM GRANULOCYTES # BLD AUTO: 0.02 K/UL (ref 0–0.04)
IMM GRANULOCYTES NFR BLD AUTO: 0.4 % (ref 0–0.5)
LYMPHOCYTES # BLD AUTO: 2.04 K/UL (ref 1–4.8)
MCH RBC QN AUTO: 27.3 PG (ref 27–31)
MCHC RBC AUTO-ENTMCNC: 32.4 G/DL (ref 32–36)
MCV RBC AUTO: 84 FL (ref 82–98)
NUCLEATED RBC (/100WBC) (OHS): 0 /100 WBC
OHS QRS DURATION: 82 MS
OHS QTC CALCULATION: 443 MS
PLATELET # BLD AUTO: 272 K/UL (ref 150–450)
PMV BLD AUTO: 8.8 FL (ref 9.2–12.9)
POTASSIUM SERPL-SCNC: 3.8 MMOL/L (ref 3.5–5.1)
POTASSIUM SERPL-SCNC: 6 MMOL/L (ref 3.5–5.1)
PROT SERPL-MCNC: 8.5 GM/DL (ref 6–8.4)
RBC # BLD AUTO: 5.53 M/UL (ref 4–5.4)
RELATIVE EOSINOPHIL (OHS): 5 %
RELATIVE LYMPHOCYTE (OHS): 36.3 % (ref 18–48)
RELATIVE MONOCYTE (OHS): 7.3 % (ref 4–15)
RELATIVE NEUTROPHIL (OHS): 50.1 % (ref 38–73)
SODIUM SERPL-SCNC: 137 MMOL/L (ref 136–145)
SODIUM SERPL-SCNC: 139 MMOL/L (ref 136–145)
TROPONIN I SERPL DL<=0.01 NG/ML-MCNC: 0.01 NG/ML
TROPONIN I SERPL DL<=0.01 NG/ML-MCNC: <0.006 NG/ML
WBC # BLD AUTO: 5.62 K/UL (ref 3.9–12.7)

## 2025-06-07 PROCEDURE — 87389 HIV-1 AG W/HIV-1&-2 AB AG IA: CPT | Performed by: EMERGENCY MEDICINE

## 2025-06-07 PROCEDURE — 84484 ASSAY OF TROPONIN QUANT: CPT | Mod: ER | Performed by: EMERGENCY MEDICINE

## 2025-06-07 PROCEDURE — 99285 EMERGENCY DEPT VISIT HI MDM: CPT | Mod: 25,ER

## 2025-06-07 PROCEDURE — 83880 ASSAY OF NATRIURETIC PEPTIDE: CPT | Mod: ER | Performed by: EMERGENCY MEDICINE

## 2025-06-07 PROCEDURE — 80053 COMPREHEN METABOLIC PANEL: CPT | Mod: ER | Performed by: EMERGENCY MEDICINE

## 2025-06-07 PROCEDURE — 25000003 PHARM REV CODE 250: Mod: ER | Performed by: EMERGENCY MEDICINE

## 2025-06-07 PROCEDURE — 86803 HEPATITIS C AB TEST: CPT | Performed by: EMERGENCY MEDICINE

## 2025-06-07 PROCEDURE — 85025 COMPLETE CBC W/AUTO DIFF WBC: CPT | Mod: ER | Performed by: EMERGENCY MEDICINE

## 2025-06-07 RX ORDER — HYDROCODONE BITARTRATE AND ACETAMINOPHEN 5; 325 MG/1; MG/1
1 TABLET ORAL EVERY 6 HOURS PRN
Qty: 12 TABLET | Refills: 0 | Status: SHIPPED | OUTPATIENT
Start: 2025-06-07

## 2025-06-07 RX ORDER — HYDROCODONE BITARTRATE AND ACETAMINOPHEN 5; 325 MG/1; MG/1
1 TABLET ORAL
Refills: 0 | Status: COMPLETED | OUTPATIENT
Start: 2025-06-07 | End: 2025-06-07

## 2025-06-07 RX ADMIN — HYDROCODONE BITARTRATE AND ACETAMINOPHEN 1 TABLET: 5; 325 TABLET ORAL at 05:06

## 2025-06-07 NOTE — ED PROVIDER NOTES
Stable weight. BMI 29.68.  Prediabetes.  . A1c 5.6%.  Elevated LDL, Low HDL with Elevated triglycerides.  Normal urine microalbumin.    Wegovy, Mounjaro and Saxenda are not covered by his insurance.  Recheck fasting labs.  I recommended that he eat healthy, lose weight and get 30 minutes of exercise 5 days a week.  RTC in 6 months and recheck fasting labs at that time.    Encounter Date: 6/7/2025       History     Chief Complaint   Patient presents with    Chest Pain     Since this morning.    Headache     Since yesterday. Patient also states she has right foot pain.     64-year-old female presents with substernal chest pain that started at 4:00 a.m. this morning.  The pain was constant and nonradiating.  She describes it as a tightness that was located in her substernal region.  The pain did not radiate.  She rated the pain 6/10.  Resolved after 2 doses of nitroglycerin at 4:00 a.m. and 9:30 a.m..  Associated symptoms include nausea but she denies shortness of breath, vomiting, diaphoresis, palpitations, or dizziness.  Patient is status post JUAN LUIS to LAD Nov 2021, s/p LHC July 2022 with widely patent stent site in the mid LAD with less than 30% in-stent stenosis.       She also reports a headache that started yesterday evening.  It is located in the left frontal region behind her eye.  She reports an elevated blood pressure.  Denies any treatment for her headache.  Associated symptoms include photophobia.    Additionally she reports chronic right foot pain with a bump on the bottom of her foot.  Patient states she normally takes Norco but she ran out.  She states she has been to a lot of specialist and they can not tell her what is wrong.          Review of patient's allergies indicates:  No Known Allergies  Past Medical History:   Diagnosis Date    Anemia     Chronic constipation     COPD (chronic obstructive pulmonary disease)     Cystic disease of breast     Diverticulitis     Hyperlipidemia     Hypertension     PAD (peripheral artery disease)      Past Surgical History:   Procedure Laterality Date    ABDOMINAL SURGERY      BREAST CYST EXCISION Right     CHOLECYSTECTOMY      HYSTERECTOMY      LEG AMPUTATION      L BKA    TOTAL ABDOMINAL HYSTERECTOMY W/ BILATERAL SALPINGOOPHORECTOMY       Family History   Problem Relation Name Age of Onset    Diabetes Mother      Hypertension Sister       Cancer Sister      Colon cancer Sister      Hypertension Father      Breast cancer Maternal Aunt       Social History[1]  Review of Systems   Constitutional:  Negative for diaphoresis.   Eyes:  Positive for photophobia.   Respiratory:  Negative for shortness of breath.    Cardiovascular:  Positive for chest pain. Negative for palpitations.   Gastrointestinal:  Positive for nausea. Negative for vomiting.   Musculoskeletal:  Positive for myalgias.   Neurological:  Positive for headaches.       Physical Exam     Initial Vitals [06/07/25 1613]   BP Pulse Resp Temp SpO2   (!) 141/76 63 18 97.9 °F (36.6 °C) 100 %      MAP       --         Physical Exam    Vitals reviewed.  Constitutional: She appears well-developed and well-nourished.   HENT:   Head: Normocephalic and atraumatic.   Eyes: EOM are normal. Pupils are equal, round, and reactive to light.   No nystagmus   Cardiovascular:  Normal rate and regular rhythm.     Exam reveals no gallop and no friction rub.       No murmur heard.  Pulses:       Radial pulses are 2+ on the right side and 2+ on the left side.        Dorsalis pedis pulses are 2+ on the right side.   Pulmonary/Chest: Effort normal. She has decreased breath sounds.   Abdominal: Abdomen is soft. She exhibits no distension. There is no abdominal tenderness.   Musculoskeletal:         General: No edema.      Comments: Left BKA  Raised tender circular fibrous area to the plantar aspect of the right foot     Neurological: She is alert and oriented to person, place, and time. She has normal strength. No cranial nerve deficit.   Skin: Skin is warm and dry.         ED Course   Procedures  Labs Reviewed   COMPREHENSIVE METABOLIC PANEL - Abnormal       Result Value    Sodium 137      Potassium 6.0 (*)     Chloride 107      CO2 18 (*)     Glucose 85      BUN 9      Creatinine 1.2      Calcium 9.5      Protein Total 8.5 (*)     Albumin 4.0      Bilirubin Total 0.3      ALP 96      AST 22      ALT 12      Anion  Gap 12      eGFR 51 (*)    CBC WITH DIFFERENTIAL - Abnormal    WBC 5.62      RBC 5.53 (*)     HGB 15.1      HCT 46.6      MCV 84      MCH 27.3      MCHC 32.4      RDW 14.6 (*)     Platelet Count 272      MPV 8.8 (*)     Nucleated RBC 0      Neut % 50.1      Lymph % 36.3      Mono % 7.3      Eos % 5.0      Basophil % 0.9      Imm Grans % 0.4      Neut # 2.82      Lymph # 2.04      Mono # 0.41      Eos # 0.28      Baso # 0.05      Imm Grans # 0.02     BASIC METABOLIC PANEL - Abnormal    Sodium 139      Potassium 3.8      Chloride 109      CO2 20 (*)     Glucose 86      BUN 8      Creatinine 1.1      Calcium 9.4      Anion Gap 10      eGFR 56 (*)    TROPONIN I - Normal    Troponin-I <0.006     TROPONIN I - Normal    Troponin-I 0.009     B-TYPE NATRIURETIC PEPTIDE - Normal    BNP 22     CBC W/ AUTO DIFFERENTIAL    Narrative:     The following orders were created for panel order CBC auto differential.  Procedure                               Abnormality         Status                     ---------                               -----------         ------                     CBC with Differential[[id number]]       Abnormal            Final result                 Please view results for these tests on the individual orders.   HEPATITIS C ANTIBODY   HEP C VIRUS HOLD SPECIMEN   HIV 1 / 2 ANTIBODY     EKG Readings: (Independently Interpreted)   Initial Reading: No STEMI. Previous EKG: Compared with most recent EKG Previous EKG Date: 5/12/2022. Rhythm: Normal Sinus Rhythm. Heart Rate: 64. Ectopy: No Ectopy. Conduction: Normal. ST Segments: Normal ST Segments. Q Waves: I, II, V4 and V5.       Imaging Results              X-Ray Foot Complete Right (Final result)  Result time 06/07/25 20:16:51      Final result by Antonio Smith MD (06/07/25 20:16:51)                   Impression:      No acute findings.  Osteopenia and mild degenerative changes.    Finalized on: 6/7/2025 8:16 PM By:  Antonio Smith MD  [facility]# [id number]       2025-06-07 20:18:53.716     [facility]               Narrative:    EXAM: XR FOOT COMPLETE 3 VIEW RIGHT    CLINICAL HISTORY:  [M79.671]-Pain in right foot.    TECHNIQUE: Frontal, lateral, and oblique views of the right foot    COMPARISON: None available.    FINDINGS:  There is no acute fracture or dislocation. Distal tibial and fibular hardware appear intact.  Bones are somewhat demineralized. No aggressive lytic or blastic lesion. No osseous erosion or aggressive periosteal reaction seen. Mild degenerative spurring noted in the midfoot and at the first MTP joint. Normal joint alignment is well-maintained. Soft tissues are unremarkable.                                         X-Ray Chest AP Portable (Final result)  Result time 06/07/25 18:21:13      Final result by Antonio Simth MD (06/07/25 18:21:13)                   Impression:      No acute cardiopulmonary abnormality.    Finalized on: 6/7/2025 6:21 PM By:  Antonio Smith MD  [facility]# [id number]      2025-06-07 18:23:17.383     [facility]               Narrative:    EXAM: XR CHEST AP PORTABLE    CLINICAL HISTORY: Chest Pain;    TECHNIQUE: Frontal view of the chest.    COMPARISON: X-ray dated 12/01/2021    FINDINGS:   Cardiomediastinal silhouette is within normal limits. Trachea is midline. Pulmonary vasculature is normal. Lungs are clear. No significant pleural effusion or pneumothorax. No acute bony abnormality.                                         CT Head Without Contrast (Final result)  Result time 06/07/25 18:07:02      Final result by Antonio Smith MD (06/07/25 18:07:02)                   Impression:      No acute intracranial abnormality.      All CT scans at [this location] are performed using dose modulation techniques as appropriate to a performed exam including the following: automated exposure control; adjustment of the mA and/or kV according to patient size (this includes techniques or standardized protocols for targeted exams where dose  is matched to indication / reason for exam; i.e. extremities or head); use of iterative reconstruction technique.    Finalized on: 6/7/2025 6:07 PM By:  Antonio Smith MD  [facility]# [id number]      2025-06-07 18:09:06.666     [facility]               Narrative:    EXAM: CT HEAD WITHOUT CONTRAST    CLINICAL HISTORY: Headache, sudden, severe;    TECHNIQUE: Contiguous axial images obtained through the head without intravenous contrast.  Sagittal and coronal reconstructions performed.    COMPARISON: None available.    FINDINGS:  Cerebral and ventricular volumes are within normal limits for the patient's age. No evidence of hydrocephalus.    No CT evidence of acute territorial infarct, intracranial hemorrhage, or mass lesion. No midline shift or mass effect.    Midline structures and posterior fossa structures appear unremarkable. Basal cisterns are patent. Bilateral carotid siphon calcification noted.    Calvarium is intact without acute or aggressive abnormality. Visualized orbits and globes are within normal limits. Visualized paranasal sinuses and mastoid air cells are clear.                                    8:28 PM - Counseling: Spoke with the patient and discussed todays findings, in addition to providing specific details for the plan of care and counseling regarding the diagnosis and prognosis. Questions are answered at this time. I have discussed with patient and/or family/caretaker chest pain precautions, specifically to return for worsening chest pain, shortness of breath, fever, or any concern.  I have low suspicion for cardiopulmonary, vascular, infectious, respiratory, or other emergent medical condition based on my evaluation in the ED.         Medications   HYDROcodone-acetaminophen 5-325 mg per tablet 1 tablet (1 tablet Oral Given 6/7/25 1726)     Medical Decision Making  6:00 PM: Care assumed by Dr Sorenson.     Amount and/or Complexity of Data Reviewed  External Data Reviewed: notes.     Details: 7/18/2022  [place]  Impressions:   1. Widely patent stent site in the mid LAD with no obstructive coronary   artery disease.   Recommendations:   1. Continue dual antiplatelet therapy and risk factor reduction.    Consider other causes of chest pain.     TTE 7/19/2024  1. Normal left ventricular cavity size. Low normal left ventricular systolic function.   LVEF 50 - 55%. Normal diastolic function. Normal wall motion.   2. Normal right ventricular size. Normal right ventricular systolic function.   3. Mild mitral valve regurgitation.     11/8/2021 [place]  61-year-old female with no known past medical history who presents with   chest pain and ST elevation on ECG.  Coronary angiography demonstrated   complete occlusion of her mid LAD and she is now status post successful   IVUS guided PCI.   -IVUS guided PCI of LAD using 3.0 x 22 mm resolute Dario drug-eluting   stent, postdilated to 4.0 mm proximally, and 3.5 mm in the mid-distal   segment of the stent.   -Normal left ventricular end-diastolic filling pressures       PLAN   1. Continue aggressive risk factor reduction   2. Optimal medical therapy for CAD including statin therapy.   3. Continue dual antiplatelet therapy with ASA 81mg daily and ticagrelor   for a minimum of 6-12 months.   4. TR band removal per protocol. Follow up with referring physician within   1-3 weeks of discharge.           Labs: ordered.  Radiology: ordered.    Risk  Prescription drug management.                                      Clinical Impression:  Final diagnoses:  [M79.671] Right foot pain  [R07.9] Chest pain, unspecified type (Primary)  [R51.9] Acute nonintractable headache, unspecified headache type          ED Disposition Condition    Discharge Stable          ED Prescriptions       Medication Sig Dispense Start Date End Date Auth. Provider    HYDROcodone-acetaminophen (NORCO) 5-325 mg per tablet Take 1 tablet by mouth every 6 (six) hours as needed. 12 tablet 6/7/2025 -- Antonio Sorenson MD           Follow-up Information       Follow up With Specialties Details Why Contact Info Additional Information    Kervin Faust FNP Family Medicine Schedule an appointment as soon as possible for a visit   [address] [facility] [address]  [phone]       [facility] Emergency Dept Emergency Medicine  If symptoms worsen [address]  Emergency Department  [facility] [phone]     [facility] Podiatry 2nd Floor Podiatry Schedule an appointment as soon as possible for a visit   [address] [facility] [phone] Please park on the [address] side and use the Clinic entrance. Check in on the 2nd floor.    [facility] Cardiology 3rd Fl Cardiology Schedule an appointment as soon as possible for a visit   [address] [facility] [id number] [phone] Please park on the [address] side and use the Clinic entrance. Check in on the 3rd floor, to the right.                   [1]   Social History  Tobacco Use    Smoking status: Every Day     Current packs/day: 0.00     Average packs/day: 0.3 packs/day for 10.0 years (2.5 ttl pk-yrs)     Types: Cigarettes     Start date: 3/26/2002     Last attempt to quit: 3/26/2012     Years since quittin.2    Smokeless tobacco: Never    Tobacco comments:     quit 3 months ago   Substance Use Topics    Alcohol use: No     Alcohol/week: 0.0 standard drinks of alcohol    Drug use: No        Antonio Sorenson MD  25

## 2025-06-08 LAB
HCV AB SERPL QL IA: NEGATIVE
HIV 1+2 AB+HIV1 P24 AG SERPL QL IA: NEGATIVE
OHS QRS DURATION: 82 MS
OHS QTC CALCULATION: 443 MS